# Patient Record
Sex: MALE | Race: WHITE | NOT HISPANIC OR LATINO | Employment: FULL TIME | URBAN - METROPOLITAN AREA
[De-identification: names, ages, dates, MRNs, and addresses within clinical notes are randomized per-mention and may not be internally consistent; named-entity substitution may affect disease eponyms.]

---

## 2017-12-29 RX ORDER — FLUTICASONE PROPIONATE 50 MCG
1-2 SPRAY, SUSPENSION (ML) NASAL DAILY PRN
COMMUNITY

## 2017-12-29 RX ORDER — MULTIPLE VITAMINS W/ MINERALS TAB 9MG-400MCG
1 TAB ORAL DAILY
COMMUNITY

## 2017-12-29 RX ORDER — KETOCONAZOLE 20 MG/G
CREAM TOPICAL DAILY PRN
Status: ON HOLD | COMMUNITY
End: 2018-01-04

## 2017-12-29 RX ORDER — LISINOPRIL AND HYDROCHLOROTHIAZIDE 20; 25 MG/1; MG/1
1 TABLET ORAL EVERY EVENING
COMMUNITY

## 2018-01-04 ENCOUNTER — ANESTHESIA (OUTPATIENT)
Dept: SURGERY | Facility: CLINIC | Age: 53
DRG: 330 | End: 2018-01-04
Payer: COMMERCIAL

## 2018-01-04 ENCOUNTER — ANESTHESIA EVENT (OUTPATIENT)
Dept: SURGERY | Facility: CLINIC | Age: 53
DRG: 330 | End: 2018-01-04
Payer: COMMERCIAL

## 2018-01-04 ENCOUNTER — HOSPITAL ENCOUNTER (INPATIENT)
Facility: CLINIC | Age: 53
LOS: 2 days | Discharge: HOME | DRG: 330 | End: 2018-01-06
Attending: COLON & RECTAL SURGERY | Admitting: COLON & RECTAL SURGERY
Payer: COMMERCIAL

## 2018-01-04 DIAGNOSIS — Z90.49 S/P PARTIAL RESECTION OF COLON: Primary | ICD-10-CM

## 2018-01-04 DIAGNOSIS — C18.7 MALIGNANT NEOPLASM OF SIGMOID (FLEXURE) (H): ICD-10-CM

## 2018-01-04 LAB
BUN SERPL-MCNC: 11 MG/DL (ref 7–30)
CALCIUM SERPL-MCNC: 8.5 MG/DL (ref 8.5–10.1)
CHLORIDE SERPL-SCNC: 98 MMOL/L (ref 94–109)
CO2 SERPL-SCNC: 27 MMOL/L (ref 20–32)
CREAT SERPL-MCNC: 0.98 MG/DL (ref 0.66–1.25)
GFR SERPL CREATININE-BSD FRML MDRD: 80 ML/MIN/1.7M2
GLUCOSE BLDC GLUCOMTR-MCNC: 234 MG/DL (ref 70–99)
GLUCOSE BLDC GLUCOMTR-MCNC: 242 MG/DL (ref 70–99)
GLUCOSE BLDC GLUCOMTR-MCNC: 269 MG/DL (ref 70–99)
GLUCOSE BLDC GLUCOMTR-MCNC: 281 MG/DL (ref 70–99)
GLUCOSE SERPL-MCNC: 225 MG/DL (ref 70–99)
GLUCOSE SERPL-MCNC: 269 MG/DL (ref 70–99)
MAGNESIUM SERPL-MCNC: 1.7 MG/DL (ref 1.6–2.3)
PHOSPHATE SERPL-MCNC: 4.5 MG/DL (ref 2.5–4.5)
PLATELET # BLD AUTO: 196 10E9/L (ref 150–450)
POTASSIUM SERPL-SCNC: 3.4 MMOL/L (ref 3.4–5.3)
POTASSIUM SERPL-SCNC: 3.9 MMOL/L (ref 3.4–5.3)
SODIUM SERPL-SCNC: 133 MMOL/L (ref 133–144)

## 2018-01-04 PROCEDURE — 71000013 ZZH RECOVERY PHASE 1 LEVEL 1 EA ADDTL HR: Performed by: COLON & RECTAL SURGERY

## 2018-01-04 PROCEDURE — 25000128 H RX IP 250 OP 636: Performed by: ANESTHESIOLOGY

## 2018-01-04 PROCEDURE — 27210995 ZZH RX 272: Performed by: COLON & RECTAL SURGERY

## 2018-01-04 PROCEDURE — 99207 ZZC CONSULT E&M CHANGED TO INITIAL LEVEL: CPT | Performed by: NURSE PRACTITIONER

## 2018-01-04 PROCEDURE — 25800025 ZZH RX 258: Performed by: COLON & RECTAL SURGERY

## 2018-01-04 PROCEDURE — 88304 TISSUE EXAM BY PATHOLOGIST: CPT | Mod: 26 | Performed by: COLON & RECTAL SURGERY

## 2018-01-04 PROCEDURE — 12000007 ZZH R&B INTERMEDIATE

## 2018-01-04 PROCEDURE — 82947 ASSAY GLUCOSE BLOOD QUANT: CPT | Performed by: ANESTHESIOLOGY

## 2018-01-04 PROCEDURE — 88305 TISSUE EXAM BY PATHOLOGIST: CPT | Performed by: COLON & RECTAL SURGERY

## 2018-01-04 PROCEDURE — 83735 ASSAY OF MAGNESIUM: CPT | Performed by: COLON & RECTAL SURGERY

## 2018-01-04 PROCEDURE — 0HBHXZZ EXCISION OF RIGHT UPPER LEG SKIN, EXTERNAL APPROACH: ICD-10-PCS | Performed by: COLON & RECTAL SURGERY

## 2018-01-04 PROCEDURE — 25000125 ZZHC RX 250: Performed by: NURSE PRACTITIONER

## 2018-01-04 PROCEDURE — 88304 TISSUE EXAM BY PATHOLOGIST: CPT | Performed by: COLON & RECTAL SURGERY

## 2018-01-04 PROCEDURE — 88305 TISSUE EXAM BY PATHOLOGIST: CPT | Mod: 26 | Performed by: COLON & RECTAL SURGERY

## 2018-01-04 PROCEDURE — 88309 TISSUE EXAM BY PATHOLOGIST: CPT | Performed by: COLON & RECTAL SURGERY

## 2018-01-04 PROCEDURE — 25000125 ZZHC RX 250: Performed by: COLON & RECTAL SURGERY

## 2018-01-04 PROCEDURE — 25000128 H RX IP 250 OP 636: Performed by: NURSE PRACTITIONER

## 2018-01-04 PROCEDURE — 36000067 ZZH SURGERY LEVEL 5 1ST 30 MIN: Performed by: COLON & RECTAL SURGERY

## 2018-01-04 PROCEDURE — 25000128 H RX IP 250 OP 636: Performed by: COLON & RECTAL SURGERY

## 2018-01-04 PROCEDURE — 25000132 ZZH RX MED GY IP 250 OP 250 PS 637: Performed by: COLON & RECTAL SURGERY

## 2018-01-04 PROCEDURE — 25000128 H RX IP 250 OP 636: Performed by: NURSE ANESTHETIST, CERTIFIED REGISTERED

## 2018-01-04 PROCEDURE — 36000069 ZZH SURGERY LEVEL 5 EA 15 ADDTL MIN: Performed by: COLON & RECTAL SURGERY

## 2018-01-04 PROCEDURE — 25000131 ZZH RX MED GY IP 250 OP 636 PS 637: Performed by: ANESTHESIOLOGY

## 2018-01-04 PROCEDURE — P9041 ALBUMIN (HUMAN),5%, 50ML: HCPCS | Performed by: NURSE ANESTHETIST, CERTIFIED REGISTERED

## 2018-01-04 PROCEDURE — 25000131 ZZH RX MED GY IP 250 OP 636 PS 637: Performed by: NURSE PRACTITIONER

## 2018-01-04 PROCEDURE — 00000146 ZZHCL STATISTIC GLUCOSE BY METER IP

## 2018-01-04 PROCEDURE — 80048 BASIC METABOLIC PNL TOTAL CA: CPT | Performed by: COLON & RECTAL SURGERY

## 2018-01-04 PROCEDURE — 40000169 ZZH STATISTIC PRE-PROCEDURE ASSESSMENT I: Performed by: COLON & RECTAL SURGERY

## 2018-01-04 PROCEDURE — 27210794 ZZH OR GENERAL SUPPLY STERILE: Performed by: COLON & RECTAL SURGERY

## 2018-01-04 PROCEDURE — 85049 AUTOMATED PLATELET COUNT: CPT | Performed by: COLON & RECTAL SURGERY

## 2018-01-04 PROCEDURE — 99223 1ST HOSP IP/OBS HIGH 75: CPT | Performed by: NURSE PRACTITIONER

## 2018-01-04 PROCEDURE — 71000012 ZZH RECOVERY PHASE 1 LEVEL 1 FIRST HR: Performed by: COLON & RECTAL SURGERY

## 2018-01-04 PROCEDURE — 37000008 ZZH ANESTHESIA TECHNICAL FEE, 1ST 30 MIN: Performed by: COLON & RECTAL SURGERY

## 2018-01-04 PROCEDURE — 88309 TISSUE EXAM BY PATHOLOGIST: CPT | Mod: 26 | Performed by: COLON & RECTAL SURGERY

## 2018-01-04 PROCEDURE — 84100 ASSAY OF PHOSPHORUS: CPT | Performed by: COLON & RECTAL SURGERY

## 2018-01-04 PROCEDURE — 36415 COLL VENOUS BLD VENIPUNCTURE: CPT | Performed by: ANESTHESIOLOGY

## 2018-01-04 PROCEDURE — 84132 ASSAY OF SERUM POTASSIUM: CPT | Performed by: ANESTHESIOLOGY

## 2018-01-04 PROCEDURE — 25000125 ZZHC RX 250: Performed by: NURSE ANESTHETIST, CERTIFIED REGISTERED

## 2018-01-04 PROCEDURE — 0DBW0ZX EXCISION OF PERITONEUM, OPEN APPROACH, DIAGNOSTIC: ICD-10-PCS | Performed by: COLON & RECTAL SURGERY

## 2018-01-04 PROCEDURE — 36415 COLL VENOUS BLD VENIPUNCTURE: CPT | Performed by: COLON & RECTAL SURGERY

## 2018-01-04 PROCEDURE — 0DTN0ZZ RESECTION OF SIGMOID COLON, OPEN APPROACH: ICD-10-PCS | Performed by: COLON & RECTAL SURGERY

## 2018-01-04 PROCEDURE — 37000009 ZZH ANESTHESIA TECHNICAL FEE, EACH ADDTL 15 MIN: Performed by: COLON & RECTAL SURGERY

## 2018-01-04 PROCEDURE — 25000566 ZZH SEVOFLURANE, EA 15 MIN: Performed by: COLON & RECTAL SURGERY

## 2018-01-04 RX ORDER — ALBUMIN, HUMAN INJ 5% 5 %
SOLUTION INTRAVENOUS CONTINUOUS PRN
Status: DISCONTINUED | OUTPATIENT
Start: 2018-01-04 | End: 2018-01-04

## 2018-01-04 RX ORDER — LIDOCAINE HYDROCHLORIDE 20 MG/ML
INJECTION, SOLUTION INFILTRATION; PERINEURAL PRN
Status: DISCONTINUED | OUTPATIENT
Start: 2018-01-04 | End: 2018-01-04

## 2018-01-04 RX ORDER — POTASSIUM CL/LIDO/0.9 % NACL 10MEQ/0.1L
10 INTRAVENOUS SOLUTION, PIGGYBACK (ML) INTRAVENOUS
Status: DISCONTINUED | OUTPATIENT
Start: 2018-01-04 | End: 2018-01-06 | Stop reason: HOSPADM

## 2018-01-04 RX ORDER — NEOSTIGMINE METHYLSULFATE 1 MG/ML
VIAL (ML) INJECTION PRN
Status: DISCONTINUED | OUTPATIENT
Start: 2018-01-04 | End: 2018-01-04

## 2018-01-04 RX ORDER — POTASSIUM CHLORIDE 7.45 MG/ML
10 INJECTION INTRAVENOUS
Status: DISCONTINUED | OUTPATIENT
Start: 2018-01-04 | End: 2018-01-06 | Stop reason: HOSPADM

## 2018-01-04 RX ORDER — ONDANSETRON 2 MG/ML
INJECTION INTRAMUSCULAR; INTRAVENOUS PRN
Status: DISCONTINUED | OUTPATIENT
Start: 2018-01-04 | End: 2018-01-04

## 2018-01-04 RX ORDER — POTASSIUM CHLORIDE 1500 MG/1
20-40 TABLET, EXTENDED RELEASE ORAL
Status: DISCONTINUED | OUTPATIENT
Start: 2018-01-04 | End: 2018-01-06 | Stop reason: HOSPADM

## 2018-01-04 RX ORDER — POTASSIUM CHLORIDE 29.8 MG/ML
20 INJECTION INTRAVENOUS
Status: DISCONTINUED | OUTPATIENT
Start: 2018-01-04 | End: 2018-01-04 | Stop reason: CLARIF

## 2018-01-04 RX ORDER — FENTANYL CITRATE 0.05 MG/ML
25-50 INJECTION, SOLUTION INTRAMUSCULAR; INTRAVENOUS
Status: DISCONTINUED | OUTPATIENT
Start: 2018-01-04 | End: 2018-01-04

## 2018-01-04 RX ORDER — SODIUM CHLORIDE, SODIUM LACTATE, POTASSIUM CHLORIDE, CALCIUM CHLORIDE 600; 310; 30; 20 MG/100ML; MG/100ML; MG/100ML; MG/100ML
INJECTION, SOLUTION INTRAVENOUS CONTINUOUS
Status: DISCONTINUED | OUTPATIENT
Start: 2018-01-04 | End: 2018-01-04

## 2018-01-04 RX ORDER — SODIUM CHLORIDE, SODIUM LACTATE, POTASSIUM CHLORIDE, CALCIUM CHLORIDE 600; 310; 30; 20 MG/100ML; MG/100ML; MG/100ML; MG/100ML
INJECTION, SOLUTION INTRAVENOUS CONTINUOUS
Status: DISCONTINUED | OUTPATIENT
Start: 2018-01-04 | End: 2018-01-04 | Stop reason: HOSPADM

## 2018-01-04 RX ORDER — ONDANSETRON 2 MG/ML
4 INJECTION INTRAMUSCULAR; INTRAVENOUS EVERY 6 HOURS PRN
Status: DISCONTINUED | OUTPATIENT
Start: 2018-01-04 | End: 2018-01-06 | Stop reason: HOSPADM

## 2018-01-04 RX ORDER — SODIUM CHLORIDE, SODIUM LACTATE, POTASSIUM CHLORIDE, CALCIUM CHLORIDE 600; 310; 30; 20 MG/100ML; MG/100ML; MG/100ML; MG/100ML
INJECTION, SOLUTION INTRAVENOUS CONTINUOUS PRN
Status: DISCONTINUED | OUTPATIENT
Start: 2018-01-04 | End: 2018-01-04

## 2018-01-04 RX ORDER — HEPARIN SODIUM 5000 [USP'U]/.5ML
5000 INJECTION, SOLUTION INTRAVENOUS; SUBCUTANEOUS
Status: COMPLETED | OUTPATIENT
Start: 2018-01-04 | End: 2018-01-04

## 2018-01-04 RX ORDER — GLYCOPYRROLATE 0.2 MG/ML
INJECTION, SOLUTION INTRAMUSCULAR; INTRAVENOUS PRN
Status: DISCONTINUED | OUTPATIENT
Start: 2018-01-04 | End: 2018-01-04

## 2018-01-04 RX ORDER — ACETAMINOPHEN 10 MG/ML
1000 INJECTION, SOLUTION INTRAVENOUS EVERY 6 HOURS
Status: DISCONTINUED | OUTPATIENT
Start: 2018-01-04 | End: 2018-01-06

## 2018-01-04 RX ORDER — PROPOFOL 10 MG/ML
INJECTION, EMULSION INTRAVENOUS PRN
Status: DISCONTINUED | OUTPATIENT
Start: 2018-01-04 | End: 2018-01-04

## 2018-01-04 RX ORDER — SODIUM CHLORIDE 9 MG/ML
INJECTION, SOLUTION INTRAVENOUS CONTINUOUS
Status: DISCONTINUED | OUTPATIENT
Start: 2018-01-04 | End: 2018-01-06

## 2018-01-04 RX ORDER — LIDOCAINE 40 MG/G
CREAM TOPICAL
Status: DISCONTINUED | OUTPATIENT
Start: 2018-01-04 | End: 2018-01-06 | Stop reason: HOSPADM

## 2018-01-04 RX ORDER — DEXTROSE MONOHYDRATE 25 G/50ML
25-50 INJECTION, SOLUTION INTRAVENOUS
Status: DISCONTINUED | OUTPATIENT
Start: 2018-01-04 | End: 2018-01-06 | Stop reason: HOSPADM

## 2018-01-04 RX ORDER — ALVIMOPAN 12 MG/1
12 CAPSULE ORAL ONCE
Status: COMPLETED | OUTPATIENT
Start: 2018-01-04 | End: 2018-01-04

## 2018-01-04 RX ORDER — MAGNESIUM HYDROXIDE 1200 MG/15ML
LIQUID ORAL PRN
Status: DISCONTINUED | OUTPATIENT
Start: 2018-01-04 | End: 2018-01-04

## 2018-01-04 RX ORDER — FENTANYL CITRATE 50 UG/ML
INJECTION, SOLUTION INTRAMUSCULAR; INTRAVENOUS PRN
Status: DISCONTINUED | OUTPATIENT
Start: 2018-01-04 | End: 2018-01-04

## 2018-01-04 RX ORDER — CIPROFLOXACIN 2 MG/ML
400 INJECTION, SOLUTION INTRAVENOUS SEE ADMIN INSTRUCTIONS
Status: DISCONTINUED | OUTPATIENT
Start: 2018-01-04 | End: 2018-01-04 | Stop reason: HOSPADM

## 2018-01-04 RX ORDER — ALVIMOPAN 12 MG/1
12 CAPSULE ORAL 2 TIMES DAILY
Status: DISCONTINUED | OUTPATIENT
Start: 2018-01-05 | End: 2018-01-05

## 2018-01-04 RX ORDER — ONDANSETRON 4 MG/1
4 TABLET, ORALLY DISINTEGRATING ORAL EVERY 30 MIN PRN
Status: DISCONTINUED | OUTPATIENT
Start: 2018-01-04 | End: 2018-01-04

## 2018-01-04 RX ORDER — HYDROMORPHONE HYDROCHLORIDE 1 MG/ML
.3-.5 INJECTION, SOLUTION INTRAMUSCULAR; INTRAVENOUS; SUBCUTANEOUS EVERY 5 MIN PRN
Status: DISCONTINUED | OUTPATIENT
Start: 2018-01-04 | End: 2018-01-04

## 2018-01-04 RX ORDER — VECURONIUM BROMIDE 1 MG/ML
INJECTION, POWDER, LYOPHILIZED, FOR SOLUTION INTRAVENOUS PRN
Status: DISCONTINUED | OUTPATIENT
Start: 2018-01-04 | End: 2018-01-04

## 2018-01-04 RX ORDER — NICOTINE POLACRILEX 4 MG
15-30 LOZENGE BUCCAL
Status: DISCONTINUED | OUTPATIENT
Start: 2018-01-04 | End: 2018-01-06 | Stop reason: HOSPADM

## 2018-01-04 RX ORDER — NALOXONE HYDROCHLORIDE 0.4 MG/ML
.1-.4 INJECTION, SOLUTION INTRAMUSCULAR; INTRAVENOUS; SUBCUTANEOUS
Status: DISCONTINUED | OUTPATIENT
Start: 2018-01-04 | End: 2018-01-06 | Stop reason: HOSPADM

## 2018-01-04 RX ORDER — CIPROFLOXACIN 2 MG/ML
400 INJECTION, SOLUTION INTRAVENOUS
Status: DISCONTINUED | OUTPATIENT
Start: 2018-01-04 | End: 2018-01-04 | Stop reason: HOSPADM

## 2018-01-04 RX ORDER — ONDANSETRON 2 MG/ML
4 INJECTION INTRAMUSCULAR; INTRAVENOUS EVERY 30 MIN PRN
Status: DISCONTINUED | OUTPATIENT
Start: 2018-01-04 | End: 2018-01-04

## 2018-01-04 RX ORDER — CIPROFLOXACIN 2 MG/ML
400 INJECTION, SOLUTION INTRAVENOUS EVERY 12 HOURS
Status: COMPLETED | OUTPATIENT
Start: 2018-01-05 | End: 2018-01-05

## 2018-01-04 RX ORDER — POTASSIUM CHLORIDE 1.5 G/1.58G
20-40 POWDER, FOR SOLUTION ORAL
Status: DISCONTINUED | OUTPATIENT
Start: 2018-01-04 | End: 2018-01-06 | Stop reason: HOSPADM

## 2018-01-04 RX ORDER — NALOXONE HYDROCHLORIDE 0.4 MG/ML
.1-.4 INJECTION, SOLUTION INTRAMUSCULAR; INTRAVENOUS; SUBCUTANEOUS
Status: DISCONTINUED | OUTPATIENT
Start: 2018-01-04 | End: 2018-01-04

## 2018-01-04 RX ORDER — ATORVASTATIN CALCIUM 40 MG/1
40 TABLET, FILM COATED ORAL AT BEDTIME
Status: DISCONTINUED | OUTPATIENT
Start: 2018-01-04 | End: 2018-01-06 | Stop reason: HOSPADM

## 2018-01-04 RX ORDER — MAGNESIUM SULFATE HEPTAHYDRATE 40 MG/ML
4 INJECTION, SOLUTION INTRAVENOUS EVERY 4 HOURS PRN
Status: DISCONTINUED | OUTPATIENT
Start: 2018-01-04 | End: 2018-01-06 | Stop reason: HOSPADM

## 2018-01-04 RX ORDER — ACETAMINOPHEN 325 MG/1
975 TABLET ORAL ONCE
Status: COMPLETED | OUTPATIENT
Start: 2018-01-04 | End: 2018-01-04

## 2018-01-04 RX ORDER — BUPIVACAINE HYDROCHLORIDE AND EPINEPHRINE 5; 5 MG/ML; UG/ML
INJECTION, SOLUTION PERINEURAL PRN
Status: DISCONTINUED | OUTPATIENT
Start: 2018-01-04 | End: 2018-01-04

## 2018-01-04 RX ADMIN — MIDAZOLAM 2 MG: 1 INJECTION INTRAMUSCULAR; INTRAVENOUS at 13:37

## 2018-01-04 RX ADMIN — VECURONIUM BROMIDE 2 MG: 1 INJECTION, POWDER, LYOPHILIZED, FOR SOLUTION INTRAVENOUS at 15:19

## 2018-01-04 RX ADMIN — SODIUM CHLORIDE, POTASSIUM CHLORIDE, SODIUM LACTATE AND CALCIUM CHLORIDE: 600; 310; 30; 20 INJECTION, SOLUTION INTRAVENOUS at 13:52

## 2018-01-04 RX ADMIN — SODIUM CHLORIDE, POTASSIUM CHLORIDE, SODIUM LACTATE AND CALCIUM CHLORIDE: 600; 310; 30; 20 INJECTION, SOLUTION INTRAVENOUS at 18:24

## 2018-01-04 RX ADMIN — HEPARIN SODIUM 5000 UNITS: 10000 INJECTION, SOLUTION INTRAVENOUS; SUBCUTANEOUS at 14:00

## 2018-01-04 RX ADMIN — VECURONIUM BROMIDE 2 MG: 1 INJECTION, POWDER, LYOPHILIZED, FOR SOLUTION INTRAVENOUS at 17:15

## 2018-01-04 RX ADMIN — INSULIN ASPART 1 UNITS: 100 INJECTION, SOLUTION INTRAVENOUS; SUBCUTANEOUS at 22:33

## 2018-01-04 RX ADMIN — SODIUM CHLORIDE: 9 INJECTION, SOLUTION INTRAVENOUS at 22:35

## 2018-01-04 RX ADMIN — MIDAZOLAM 2 MG: 1 INJECTION INTRAMUSCULAR; INTRAVENOUS at 13:42

## 2018-01-04 RX ADMIN — HYDROMORPHONE HYDROCHLORIDE 0.5 MG: 1 INJECTION, SOLUTION INTRAMUSCULAR; INTRAVENOUS; SUBCUTANEOUS at 14:32

## 2018-01-04 RX ADMIN — FENTANYL CITRATE 50 MCG: 50 INJECTION, SOLUTION INTRAMUSCULAR; INTRAVENOUS at 14:10

## 2018-01-04 RX ADMIN — ATORVASTATIN CALCIUM 40 MG: 40 TABLET, FILM COATED ORAL at 22:32

## 2018-01-04 RX ADMIN — DEXMEDETOMIDINE HYDROCHLORIDE 8 MCG: 100 INJECTION, SOLUTION INTRAVENOUS at 18:11

## 2018-01-04 RX ADMIN — VECURONIUM BROMIDE 1 MG: 1 INJECTION, POWDER, LYOPHILIZED, FOR SOLUTION INTRAVENOUS at 17:54

## 2018-01-04 RX ADMIN — METRONIDAZOLE 500 MG: 500 INJECTION, SOLUTION INTRAVENOUS at 14:01

## 2018-01-04 RX ADMIN — Medication 2 G: at 23:22

## 2018-01-04 RX ADMIN — FENTANYL CITRATE 50 MCG: 50 INJECTION, SOLUTION INTRAMUSCULAR; INTRAVENOUS at 13:42

## 2018-01-04 RX ADMIN — NEOSTIGMINE METHYLSULFATE 5 MG: 1 INJECTION INTRAMUSCULAR; INTRAVENOUS; SUBCUTANEOUS at 18:29

## 2018-01-04 RX ADMIN — VECURONIUM BROMIDE 2 MG: 1 INJECTION, POWDER, LYOPHILIZED, FOR SOLUTION INTRAVENOUS at 16:15

## 2018-01-04 RX ADMIN — VECURONIUM BROMIDE 2 MG: 1 INJECTION, POWDER, LYOPHILIZED, FOR SOLUTION INTRAVENOUS at 16:54

## 2018-01-04 RX ADMIN — ALBUMIN (HUMAN): 12.5 SOLUTION INTRAVENOUS at 14:02

## 2018-01-04 RX ADMIN — FENTANYL CITRATE 50 MCG: 50 INJECTION, SOLUTION INTRAMUSCULAR; INTRAVENOUS at 13:47

## 2018-01-04 RX ADMIN — DEXMEDETOMIDINE HYDROCHLORIDE 8 MCG: 100 INJECTION, SOLUTION INTRAVENOUS at 15:49

## 2018-01-04 RX ADMIN — CIPROFLOXACIN 400 MG: 2 INJECTION INTRAVENOUS at 14:01

## 2018-01-04 RX ADMIN — ONDANSETRON 4 MG: 2 INJECTION INTRAMUSCULAR; INTRAVENOUS at 18:20

## 2018-01-04 RX ADMIN — DEXMEDETOMIDINE HYDROCHLORIDE 4 MCG: 100 INJECTION, SOLUTION INTRAVENOUS at 16:14

## 2018-01-04 RX ADMIN — GLYCOPYRROLATE 0.8 MG: 0.2 INJECTION, SOLUTION INTRAMUSCULAR; INTRAVENOUS at 18:28

## 2018-01-04 RX ADMIN — VECURONIUM BROMIDE 2 MG: 1 INJECTION, POWDER, LYOPHILIZED, FOR SOLUTION INTRAVENOUS at 16:26

## 2018-01-04 RX ADMIN — HYDROMORPHONE HYDROCHLORIDE 0.5 MG: 1 INJECTION, SOLUTION INTRAMUSCULAR; INTRAVENOUS; SUBCUTANEOUS at 17:02

## 2018-01-04 RX ADMIN — ACETAMINOPHEN 975 MG: 325 TABLET, FILM COATED ORAL at 11:47

## 2018-01-04 RX ADMIN — SODIUM CHLORIDE, POTASSIUM CHLORIDE, SODIUM LACTATE AND CALCIUM CHLORIDE: 600; 310; 30; 20 INJECTION, SOLUTION INTRAVENOUS at 12:20

## 2018-01-04 RX ADMIN — ROCURONIUM BROMIDE 50 MG: 10 INJECTION INTRAVENOUS at 13:47

## 2018-01-04 RX ADMIN — VECURONIUM BROMIDE 2 MG: 1 INJECTION, POWDER, LYOPHILIZED, FOR SOLUTION INTRAVENOUS at 15:47

## 2018-01-04 RX ADMIN — SODIUM CHLORIDE, POTASSIUM CHLORIDE, SODIUM LACTATE AND CALCIUM CHLORIDE: 600; 310; 30; 20 INJECTION, SOLUTION INTRAVENOUS at 15:49

## 2018-01-04 RX ADMIN — ALVIMOPAN 12 MG: 12 CAPSULE ORAL at 11:48

## 2018-01-04 RX ADMIN — SODIUM CHLORIDE, POTASSIUM CHLORIDE, SODIUM LACTATE AND CALCIUM CHLORIDE: 600; 310; 30; 20 INJECTION, SOLUTION INTRAVENOUS at 15:39

## 2018-01-04 RX ADMIN — LIDOCAINE HYDROCHLORIDE 80 MG: 20 INJECTION, SOLUTION INFILTRATION; PERINEURAL at 13:47

## 2018-01-04 RX ADMIN — VECURONIUM BROMIDE 4 MG: 1 INJECTION, POWDER, LYOPHILIZED, FOR SOLUTION INTRAVENOUS at 14:25

## 2018-01-04 RX ADMIN — Medication: at 19:22

## 2018-01-04 RX ADMIN — VECURONIUM BROMIDE 1 MG: 1 INJECTION, POWDER, LYOPHILIZED, FOR SOLUTION INTRAVENOUS at 18:01

## 2018-01-04 RX ADMIN — FENTANYL CITRATE 50 MCG: 50 INJECTION, SOLUTION INTRAMUSCULAR; INTRAVENOUS at 14:41

## 2018-01-04 RX ADMIN — DEXMEDETOMIDINE HYDROCHLORIDE 8 MCG: 100 INJECTION, SOLUTION INTRAVENOUS at 14:51

## 2018-01-04 RX ADMIN — SODIUM CHLORIDE 4 UNITS: 9 INJECTION, SOLUTION INTRAVENOUS at 20:02

## 2018-01-04 RX ADMIN — FENTANYL CITRATE 50 MCG: 50 INJECTION, SOLUTION INTRAMUSCULAR; INTRAVENOUS at 19:28

## 2018-01-04 RX ADMIN — METRONIDAZOLE 500 MG: 500 INJECTION, SOLUTION INTRAVENOUS at 22:07

## 2018-01-04 RX ADMIN — DEXMEDETOMIDINE HYDROCHLORIDE 4 MCG: 100 INJECTION, SOLUTION INTRAVENOUS at 16:15

## 2018-01-04 RX ADMIN — Medication 0.5 MG: at 19:49

## 2018-01-04 RX ADMIN — PROPOFOL 200 MG: 10 INJECTION, EMULSION INTRAVENOUS at 13:47

## 2018-01-04 ASSESSMENT — ENCOUNTER SYMPTOMS: DYSRHYTHMIAS: 0

## 2018-01-04 ASSESSMENT — LIFESTYLE VARIABLES: TOBACCO_USE: 0

## 2018-01-04 NOTE — ANESTHESIA PREPROCEDURE EVALUATION
Procedure: Procedure(s):  LAPAROSCOPIC ASSISTED SIGMOID COLECTOMY  Preop diagnosis: COLON CANCER    Not on File  Past Medical History:   Diagnosis Date     Adenocarcinoma of sigmoid colon (H)      Allergic rhinitis      Diabetes (H)     type 2     Erectile dysfunction      Hyperlipidemia      Hypertension      Past Surgical History:   Procedure Laterality Date     COLONOSCOPY       wisdom teeth removal       Social History   Substance Use Topics     Smoking status: Never Smoker     Smokeless tobacco: Never Used     Alcohol use Yes      Comment: rare     Prior to Admission medications    Medication Sig Start Date End Date Taking? Authorizing Provider   Sildenafil Citrate (VIAGRA PO) Take 100 mg by mouth daily as needed   Yes Reported, Patient   multivitamin, therapeutic with minerals (THERA-VIT-M) TABS tablet Take 1 tablet by mouth daily   Yes Reported, Patient   ASPIRIN PO Take 81 mg by mouth daily   Yes Reported, Patient   Insulin Glargine (BASAGLAR KWIKPEN SC) Inject 15 Units Subcutaneous every evening    Yes Reported, Patient   lisinopril-hydrochlorothiazide (PRINZIDE/ZESTORETIC) 20-25 MG per tablet Take 1 tablet by mouth every evening   Yes Reported, Patient   METFORMIN HCL PO Take 1,000 mg by mouth 2 times daily   Yes Reported, Patient   ATORVASTATIN CALCIUM PO Take 40 mg by mouth daily   Yes Reported, Patient   Omega-3 Fatty Acids (OMEGA 3 PO) Take 2 tablets by mouth 2 times daily   Yes Reported, Patient   Cetirizine HCl (ZYRTEC PO) Take 10 mg by mouth daily as needed   Yes Reported, Patient   fluticasone (FLONASE) 50 MCG/ACT spray Spray 1-2 sprays into both nostrils daily as needed for rhinitis or allergies   Yes Reported, Patient   Naproxen Sodium (ALEVE PO) Take 2 tablets by mouth daily as needed for moderate pain   Yes Reported, Patient   NEOMYCIN SULFATE PO Take 1,000 mg by mouth 3 times daily Per PreOp Instructions  (Takes 2 x 500mg tablet = 1000mg dose) 1/3/18 1/3/18  Reported, Patient   METRONIDAZOLE  PO Take 500 mg by mouth 3 times daily Per PreOp Instructions 1/3/18 1/3/18  Reported, Patient     Current Facility-Administered Medications Ordered in Epic   Medication Dose Route Frequency Last Rate Last Dose     heparin sodium PF injection 5,000 Units  5,000 Units Subcutaneous Pre-Op/Pre-procedure x 1 dose         ciprofloxacin (CIPRO) infusion 400 mg  400 mg Intravenous Pre-Op/Pre-procedure x 1 dose         ciprofloxacin (CIPRO) infusion 400 mg  400 mg Intravenous See Admin Instructions         metroNIDAZOLE (FLAGYL) infusion 500 mg  500 mg Intravenous Pre-Op/Pre-procedure x 1 dose         metroNIDAZOLE (FLAGYL) infusion 500 mg  500 mg Intravenous See Admin Instructions         lidocaine 1 % 1 mL  1 mL Other Q1H PRN         sodium chloride (PF) 0.9% PF flush 3 mL  3 mL Intracatheter Q8H         lactated ringers infusion   Intravenous Continuous 25 mL/hr at 01/04/18 1220       No current [facility]-ordered outpatient prescriptions on file.       lactated ringers 25 mL/hr at 01/04/18 1220     Wt Readings from Last 1 Encounters:   01/04/18 117 kg (258 lb)     Temp Readings from Last 1 Encounters:   01/04/18 36.3  C (97.4  F) (Temporal)     BP Readings from Last 6 Encounters:   01/04/18 119/84     Pulse Readings from Last 4 Encounters:   No data found for Pulse     Resp Readings from Last 1 Encounters:   01/04/18 18     SpO2 Readings from Last 1 Encounters:   01/04/18 100%     Recent Labs   Lab Test  01/04/18   1132   POTASSIUM  3.4   GLC  225*     No results for input(s): AST, ALT, ALKPHOS, BILITOTAL, LIPASE in the last 41213 hours.  No results for input(s): WBC, HGB, PLT in the last 24282 hours.  No results for input(s): ABO, RH in the last 05906 hours.  No results for input(s): INR, PTT in the last 91632 hours.   No results for input(s): TROPI in the last 93528 hours.  No results for input(s): PH, PCO2, PO2, HCO3 in the last 53096 hours.  No results for input(s): HCG in the last 37839 hours.  No results found for this  or any previous visit (from the past 744 hour(s)).    RECENT LABS:   ECG:   ECHO:     Anesthesia Evaluation     .             ROS/MED HX    ENT/Pulmonary:     (+)allergic rhinitis, , . .   (-) tobacco use and sleep apnea   Neurologic:       Cardiovascular:     (+) Dyslipidemia, hypertension----. : . . . :. .      (-) arrhythmias   METS/Exercise Tolerance:     Hematologic:         Musculoskeletal:         GI/Hepatic:        (-) GERD   Renal/Genitourinary:         Endo:     (+) type II DM thyroid problem  Thyroid disease - Other, .      Psychiatric:         Infectious Disease:         Malignancy:         Other:                     Physical Exam  Normal systems: cardiovascular, pulmonary and dental    Airway   Mallampati: III  TM distance: >3 FB  Neck ROM: full    Dental     Cardiovascular       Pulmonary                     Anesthesia Plan      History & Physical Review  History and physical reviewed and following examination; no interval change.    ASA Status:  2 .    NPO Status:  > 8 hours    Plan for General and ETT with Intravenous and Propofol induction. Maintenance will be Inhalation.    PONV prophylaxis:  Ondansetron (or other 5HT-3) and Dexamethasone or Solumedrol  Additional equipment: Videolaryngoscope and 2nd IV 12.5mg Benadryl, 4mg Decadron and Zofran for PONV      Postoperative Care  Postoperative pain management:  IV analgesics and Oral pain medications.      Consents  Anesthetic plan, risks, benefits and alternatives discussed with:  Patient and Spouse..                          .

## 2018-01-04 NOTE — IP AVS SNAPSHOT
MRN:[id number]                      After Visit Summary   1/4/2018    Edilberto Zhao    MRN: [id number]           Thank you!     Thank you for choosing [facility] for your care. Our goal is always to provide you with excellent care. Hearing back from our patients is one way we can continue to improve our services. Please take a few minutes to complete the written survey that you may receive in the mail after you visit with us. Thank you!        Patient Information     Date Of Birth          1965        Designated Caregiver       Most Recent Value    Caregiver    Will someone help with your care after discharge? yes    Name of designated caregiver Rosa Isela Zhao    Phone number of caregiver [phone]    Caregiver address [address].       About your hospital stay     You were admitted on:  January 4, 2018 You last received care in the:  [place] [address] [facility]    You were discharged on:  January 6, 2018        Reason for your hospital stay       The patient was in the hospital to have surgery for cancer                  Who to Call     For medical emergencies, please call 911.  For non-urgent questions about your medical care, please call your primary care provider or clinic, [phone]  For questions related to your surgery, please call your surgery clinic        Attending Provider     Provider Specialty    Chiquis Pierre MD Colon and Rectal Surgery       Primary Care Provider Office Phone # Fax #    Carlie Costa [phone]      After Care Instructions     Activity       Your activity upon discharge:No heavy lifting or straining over 15-20 lbs for 6 weeks. No Driving while taking narcotic pain medications            Diet       Follow this diet upon discharge: Continue a low residue diet for 6 weeks.                  Follow-up Appointments     Follow-up and recommended labs and tests        Follow up in the office in 3-4 weeks with   Gabby or at your already scheduled post op visit. Call [phone] to schedule your appointment. Call the office at [phone] if you develop fever, uncontrolled pain, bleeding, nausea, vomiting, or constipation.                  Further instructions from your care team       Discharge Instructions following Abdominal Surgery  [facility]      Bowel Function  After removal of a portion of the intestines, it is normal for bowel movements to be erratic.  They are often looser and more frequent.  This condition generally resolves within a few weeks or it can be controlled with diet or medication.  Diet  In general, you may return to a well-balanced diet upon discharge.  Your doctor will let you know when to add extra fiber to your diet.  Be sure to drink plenty of fluids.  Incision  You should expect some discomfort in the area of your incision, particularly as you increase your activity.  If you notice an area of increasing redness or new drainage, please call your doctor.  You may shower as desired but refrain from tub baths or swimming until the incisions are fully healed.  Activity  Gradually increase your activity each day.  There are generally no restrictions on walking, climbing stairs, or riding in a car.  You can usually drive a car 7-10 days after your leave the hospital.  Do not drive while taking narcotic pain medications.  Ask your doctor regarding resumption of physical sexual activity; in most cases, you can resume sex after a few weeks.  Your physician will advise you about when to return to work.  It is preferable to have somebody stay with you at home until you are fully healed and able to resume a normal level of activity   Medications  You will be discharged with a prescription pain medication and any medications you were taking prior to surgery.  Do not drive while taking narcotic pain medications.  If you need additional medications or a refill, you must  "call your doctor during normal business hours.  It is the policy of [facility] to not refill pain medication after hours or on weekends because your chart is not available.  Follow-up  Typically, you will be asked to schedule a follow-up office visit 1 to 4 weeks after surgery.  If you have any questions related to follow-up, medications, or your condition, please call the office.      Call your surgeon if you have these signs or symptoms:  ([phone])    Problem with the incision, including increasing pain, swelling, redness, or drainage    Increasing abdominal pain    Uncontrolled nausea or vomiting    Fever or chills    Constipation  (no bowel movement for 3 days)    Diarrhea (more than 3 watery stools within 24 hours)    Bleeding from the rectum, wound, or stoma    Any questions or concerns      Pending Results     Date and Time Order Name Status Description    1/4/2018 1422 Surgical pathology exam In process             Statement of Approval     Ordered          01/06/18 0941  I have reviewed and agree with all the recommendations and orders detailed in this document.  EFFECTIVE NOW     Approved and electronically signed by:  Chiquis Crouch MD             Admission Information     Date & Time Provider Department Dept. Phone    1/4/2018 Chiquis Pierre MD [place] [address] [facility] [phone]      Your Vitals Were     Blood Pressure Pulse Temperature Respirations Height Weight    137/85 (BP Location: Right arm) 73 98.2  F (36.8  C) (Oral) 16 1.753 m (5' 9\") 117 kg (258 lb)    Pulse Oximetry BMI (Body Mass Index)                97% 38.1 kg/m2          [facility] Information     [employer] lets you send messages to your doctor, view your test results, renew your prescriptions, schedule appointments and more. To sign up, go to [email or web address] . Click on \"Log in\" on the left side of the screen, which will take you to the Welcome page. Then click on " "\"Sign up Now\" on the right side of the page.     You will be asked to enter the access code listed below, as well as some personal information. Please follow the directions to create your username and password.     Your access code is: [email or web address]  Expires: 2018 12:00 PM     Your access code will  in 90 days. If you need help or a new code, please call your [place] clinic or [phone].        Care EveryWhere ID     This is your Care EveryWhere ID. This could be used by other organizations to access your [place] medical records  [id number]        Equal Access to Services     [facility]: Hadjosé miguel Palacios, antonio oakley, alvaro bach, hollie mann . So [facility] [phone].    ATENCIÓN: Si habla español, tiene a fernandes disposición servicios gratuitos de asistencia lingüística. Llame al [phone].    We comply with applicable federal civil rights laws and [place] laws. We do not discriminate on the basis of race, color, national origin, age, disability, sex, sexual orientation, or gender identity.               Review of your medicines      START taking        Dose / Directions    enoxaparin 40 MG/0.4ML injection   Commonly known as:  LOVENOX        Dose:  40 mg   Inject 0.4 mLs (40 mg) Subcutaneous every 24 hours   Quantity:  28 Syringe   Refills:  0       oxyCODONE IR 5 MG tablet   Commonly known as:  ROXICODONE   Used for:  S/P partial resection of colon   Notes to Patient:  No alcohol and no driving while on this medication        Dose:  5 mg   Take 1 tablet (5 mg) by mouth every 4 hours as needed for moderate to severe pain   Quantity:  30 tablet   Refills:  0         CONTINUE these medicines which have NOT CHANGED        Dose / Directions    ALEVE PO   Notes to Patient:  Resume per patient        Dose:  2 tablet   Take 2 tablets by mouth daily as needed for moderate pain   Refills:  0       ASPIRIN PO   Notes to Patient:  Resume per patient "        Dose:  81 mg   Take 81 mg by mouth daily   Refills:  0       ATORVASTATIN CALCIUM PO        Dose:  40 mg   Take 40 mg by mouth At Bedtime   Refills:  0       BASAGLAR KWIKPEN SC        Dose:  15 Units   Inject 15 Units Subcutaneous every evening   Refills:  0       fluticasone 50 MCG/ACT spray   Commonly known as:  FLONASE   Notes to Patient:  Resume per patient         Dose:  1-2 spray   Spray 1-2 sprays into both nostrils daily as needed for rhinitis or allergies   Refills:  0       lisinopril-hydrochlorothiazide 20-25 MG per tablet   Commonly known as:  PRINZIDE/ZESTORETIC   Notes to Patient:  Resume per patient        Dose:  1 tablet   Take 1 tablet by mouth every evening   Refills:  0       METFORMIN HCL PO        Dose:  1000 mg   Take 1,000 mg by mouth 2 times daily   Refills:  0       multivitamin, therapeutic with minerals Tabs tablet   Notes to Patient:  Resume per patient        Dose:  1 tablet   Take 1 tablet by mouth daily   Refills:  0       OMEGA 3 PO   Notes to Patient:  Resume per patient        Dose:  2 tablet   Take 2 tablets by mouth 2 times daily   Refills:  0       VIAGRA PO   Notes to Patient:  Resume per patient        Dose:  100 mg   Take 100 mg by mouth daily as needed   Refills:  0       ZYRTEC PO   Notes to Patient:  Resume per patient        Dose:  10 mg   Take 10 mg by mouth daily as needed   Refills:  0         STOP taking     METRONIDAZOLE PO           NEOMYCIN SULFATE PO                Where to get your medicines      These medications were sent to [facility] - [address]  [address], [place] [address]     Phone:  [phone]     enoxaparin 40 MG/0.4ML injection         Some of these will need a paper prescription and others can be bought over the counter. Ask your nurse if you have questions.     Bring a paper prescription for each of these medications     oxyCODONE IR 5 MG tablet                Protect others around you: Learn  how to safely use, store and throw away your medicines at www.disposemymeds.org.             Medication List: This is a list of all your medications and when to take them. Check marks below indicate your daily home schedule. Keep this list as a reference.      Medications           Morning Afternoon Evening Bedtime As Needed    ALEVE PO   Take 2 tablets by mouth daily as needed for moderate pain   Notes to Patient:  Resume per patient                                   ASPIRIN PO   Take 81 mg by mouth daily   Notes to Patient:  Resume per patient                                   ATORVASTATIN CALCIUM PO   Take 40 mg by mouth At Bedtime   Last time this was given:  40 mg on 1/5/2018 10:00 PM                        1/6/18             BASAGLAR KWIKPEN SC   Inject 15 Units Subcutaneous every evening   Last time this was given:  7 Units on 1/5/2018 10:04 PM   Next Dose Due:  1/6/18 pm dose                    1/6/18               enoxaparin 40 MG/0.4ML injection   Commonly known as:  LOVENOX   Inject 0.4 mLs (40 mg) Subcutaneous every 24 hours   Last time this was given:  40 mg on 1/6/2018 11:21 AM   Next Dose Due:  1/7/18 12 pm             1/7/18                   fluticasone 50 MCG/ACT spray   Commonly known as:  FLONASE   Spray 1-2 sprays into both nostrils daily as needed for rhinitis or allergies   Notes to Patient:  Resume per patient                                    lisinopril-hydrochlorothiazide 20-25 MG per tablet   Commonly known as:  PRINZIDE/ZESTORETIC   Take 1 tablet by mouth every evening   Notes to Patient:  Resume per patient                                   METFORMIN HCL PO   Take 1,000 mg by mouth 2 times daily   Last time this was given:  500 mg on 1/6/2018 10:03 AM   Next Dose Due:  1/6/18 evening dose                       1/6/18               multivitamin, therapeutic with minerals Tabs tablet   Take 1 tablet by mouth daily   Notes to Patient:  Resume per patient                                    OMEGA 3 PO   Take 2 tablets by mouth 2 times daily   Notes to Patient:  Resume per patient                                      oxyCODONE IR 5 MG tablet   Commonly known as:  ROXICODONE   Take 1 tablet (5 mg) by mouth every 4 hours as needed for moderate to severe pain   Notes to Patient:  No alcohol and no driving while on this medication                                   VIAGRA PO   Take 100 mg by mouth daily as needed   Notes to Patient:  Resume per patient                                   ZYRTEC PO   Take 10 mg by mouth daily as needed   Notes to Patient:  Resume per patient                                             More Information        Enoxaparin injection  Brand Name: Lovenox  What is this medicine?  ENOXAPARIN (ee nox a PA rin) is used after knee, hip, or abdominal surgeries to prevent blood clotting. It is also used to treat existing blood clots in the lungs or in the veins.  How should I use this medicine?  This medicine is for injection under the skin. It is usually given by a health-care professional. You or a family member may be trained on how to give the injections. If you are to give yourself injections, make sure you understand how to use the syringe, measure the dose if necessary, and give the injection. To avoid bruising, do not rub the site where this medicine has been injected. Do not take your medicine more often than directed. Do not stop taking except on the advice of your doctor or health care professional.  Make sure you receive a puncture-resistant container to dispose of the needles and syringes once you have finished with them. Do not reuse these items. Return the container to your doctor or health care professional for proper disposal.  Talk to your pediatrician regarding the use of this medicine in children. Special care may be needed.  What side effects may I notice from receiving this medicine?  Side effects that you should report to your doctor or health care professional as  soon as possible:    allergic reactions like skin rash, itching or hives, swelling of the face, lips, or tongue    feeling faint or lightheaded, falls    signs and symptoms of bleeding such as bloody or black, tarry stools; red or dark-brown urine; spitting up blood or brown material that looks like coffee grounds; red spots on the skin; unusual bruising or bleeding from the eye, gums, or nose  Side effects that usually do not require medical attention (report to your doctor or health care professional if they continue or are bothersome):    pain, redness, or irritation at site where injected  What may interact with this medicine?    aspirin and aspirin-like medicines    certain medicines that treat or prevent blood clots    dipyridamole    NSAIDs, medicines for pain and inflammation, like ibuprofen or naproxen  What if I miss a dose?  If you miss a dose, take it as soon as you can. If it is almost time for your next dose, take only that dose. Do not take double or extra doses.  Where should I keep my medicine?  Keep out of the reach of children.  Store at room temperature between 15 and 30 degrees C (59 and 86 degrees F). Do not freeze. If your injections have been specially prepared, you may need to store them in the refrigerator. Ask your pharmacist. Throw away any unused medicine after the expiration date.  What should I tell my health care provider before I take this medicine?  They need to know if you have any of these conditions:    bleeding disorders, hemorrhage, or hemophilia    infection of the heart or heart valves    kidney or liver disease    previous stroke    prosthetic heart valve    recent surgery or delivery of a baby    ulcer in the stomach or intestine, diverticulitis, or other bowel disease    an unusual or allergic reaction to enoxaparin, heparin, pork or pork products, other medicines, foods, dyes, or preservatives    pregnant or trying to get pregnant    breast-feeding  What should I watch  for while using this medicine?  Visit your doctor or health care professional for regular checks on your progress. Your condition will be monitored carefully while you are receiving this medicine.  Notify your doctor or health care professional and seek emergency treatment if you develop breathing problems; changes in vision; chest pain; severe, sudden headache; pain, swelling, warmth in the leg; trouble speaking; sudden numbness or weakness of the face, arm, or leg. These can be signs that your condition has gotten worse.  If you are going to have surgery, tell your doctor or health care professional that you are taking this medicine.  Do not stop taking this medicine without first talking to your doctor. Be sure to refill your prescription before you run out of medicine.  Avoid sports and activities that might cause injury while you are using this medicine. Severe falls or injuries can cause unseen bleeding. Be careful when using sharp tools or knives. Consider using an electric razor. Take special care brushing or flossing your teeth. Report any injuries, bruising, or red spots on the skin to your doctor or health care professional.  NOTE:This sheet is a summary. It may not cover all possible information. If you have questions about this medicine, talk to your doctor, pharmacist, or health care provider. Copyright  2017 [employer]

## 2018-01-04 NOTE — IP AVS SNAPSHOT
[facility] [place] [address] [facility]    [address] [place] [address]    Phone:  [phone]                                       After Visit Summary   1/4/2018    Edilberto Zhao    MRN: [id number]           After Visit Summary Signature Page     I have received my discharge instructions, and my questions have been answered. I have discussed any challenges I see with this plan with the nurse or doctor.    ..........................................................................................................................................  Patient/Patient Representative Signature      ..........................................................................................................................................  Patient Representative Print Name and Relationship to Patient    ..................................................               ................................................  Date                                            Time    ..........................................................................................................................................  Reviewed by Signature/Title    ...................................................              ..............................................  Date                                                            Time

## 2018-01-05 LAB
ANION GAP SERPL CALCULATED.3IONS-SCNC: 7 MMOL/L (ref 3–14)
BUN SERPL-MCNC: 9 MG/DL (ref 7–30)
CALCIUM SERPL-MCNC: 8.4 MG/DL (ref 8.5–10.1)
CHLORIDE SERPL-SCNC: 100 MMOL/L (ref 94–109)
CO2 SERPL-SCNC: 30 MMOL/L (ref 20–32)
CREAT SERPL-MCNC: 0.95 MG/DL (ref 0.66–1.25)
GFR SERPL CREATININE-BSD FRML MDRD: 83 ML/MIN/1.7M2
GLUCOSE BLDC GLUCOMTR-MCNC: 182 MG/DL (ref 70–99)
GLUCOSE BLDC GLUCOMTR-MCNC: 206 MG/DL (ref 70–99)
GLUCOSE BLDC GLUCOMTR-MCNC: 222 MG/DL (ref 70–99)
GLUCOSE BLDC GLUCOMTR-MCNC: 224 MG/DL (ref 70–99)
GLUCOSE SERPL-MCNC: 219 MG/DL (ref 70–99)
HGB BLD-MCNC: 13 G/DL (ref 13.3–17.7)
PHOSPHATE SERPL-MCNC: 3.3 MG/DL (ref 2.5–4.5)
POTASSIUM SERPL-SCNC: 4.1 MMOL/L (ref 3.4–5.3)
SODIUM SERPL-SCNC: 137 MMOL/L (ref 133–144)

## 2018-01-05 PROCEDURE — 80048 BASIC METABOLIC PNL TOTAL CA: CPT | Performed by: COLON & RECTAL SURGERY

## 2018-01-05 PROCEDURE — 36415 COLL VENOUS BLD VENIPUNCTURE: CPT | Performed by: COLON & RECTAL SURGERY

## 2018-01-05 PROCEDURE — 12000007 ZZH R&B INTERMEDIATE

## 2018-01-05 PROCEDURE — 25000132 ZZH RX MED GY IP 250 OP 250 PS 637: Performed by: COLON & RECTAL SURGERY

## 2018-01-05 PROCEDURE — 25000131 ZZH RX MED GY IP 250 OP 636 PS 637: Performed by: INTERNAL MEDICINE

## 2018-01-05 PROCEDURE — 25000128 H RX IP 250 OP 636: Performed by: COLON & RECTAL SURGERY

## 2018-01-05 PROCEDURE — 00000146 ZZHCL STATISTIC GLUCOSE BY METER IP

## 2018-01-05 PROCEDURE — 99232 SBSQ HOSP IP/OBS MODERATE 35: CPT | Performed by: INTERNAL MEDICINE

## 2018-01-05 PROCEDURE — 84100 ASSAY OF PHOSPHORUS: CPT | Performed by: COLON & RECTAL SURGERY

## 2018-01-05 PROCEDURE — 25000128 H RX IP 250 OP 636: Performed by: PHYSICIAN ASSISTANT

## 2018-01-05 PROCEDURE — 25000125 ZZHC RX 250: Performed by: COLON & RECTAL SURGERY

## 2018-01-05 PROCEDURE — 85018 HEMOGLOBIN: CPT | Performed by: COLON & RECTAL SURGERY

## 2018-01-05 RX ADMIN — INSULIN GLARGINE 7 UNITS: 100 INJECTION, SOLUTION SUBCUTANEOUS at 22:04

## 2018-01-05 RX ADMIN — CIPROFLOXACIN 400 MG: 2 INJECTION, SOLUTION INTRAVENOUS at 02:58

## 2018-01-05 RX ADMIN — METRONIDAZOLE 500 MG: 500 INJECTION, SOLUTION INTRAVENOUS at 16:11

## 2018-01-05 RX ADMIN — CIPROFLOXACIN 400 MG: 2 INJECTION, SOLUTION INTRAVENOUS at 14:25

## 2018-01-05 RX ADMIN — ENOXAPARIN SODIUM 40 MG: 40 INJECTION SUBCUTANEOUS at 13:14

## 2018-01-05 RX ADMIN — ACETAMINOPHEN 1000 MG: 10 INJECTION, SOLUTION INTRAVENOUS at 08:28

## 2018-01-05 RX ADMIN — Medication: at 14:42

## 2018-01-05 RX ADMIN — ACETAMINOPHEN 1000 MG: 10 INJECTION, SOLUTION INTRAVENOUS at 00:21

## 2018-01-05 RX ADMIN — SODIUM CHLORIDE: 9 INJECTION, SOLUTION INTRAVENOUS at 16:12

## 2018-01-05 RX ADMIN — ACETAMINOPHEN 1000 MG: 10 INJECTION, SOLUTION INTRAVENOUS at 21:02

## 2018-01-05 RX ADMIN — ACETAMINOPHEN 1000 MG: 10 INJECTION, SOLUTION INTRAVENOUS at 13:08

## 2018-01-05 RX ADMIN — METRONIDAZOLE 500 MG: 500 INJECTION, SOLUTION INTRAVENOUS at 05:58

## 2018-01-05 RX ADMIN — ATORVASTATIN CALCIUM 40 MG: 40 TABLET, FILM COATED ORAL at 22:00

## 2018-01-05 RX ADMIN — ALVIMOPAN 12 MG: 12 CAPSULE ORAL at 08:27

## 2018-01-05 NOTE — ANESTHESIA CARE TRANSFER NOTE
Patient: Edilberto Zhao    Procedure(s):  LAPAROSCOPIC ASSISTED Anterior SIGMOID RESECTION - Wound Class: II-Clean Contaminated    Diagnosis: COLON CANCER  Diagnosis Additional Information: No value filed.    Anesthesia Type:   General, ETT     Note:  Airway :Face Mask  Patient transferred to:PACU  Handoff Report: Identifed the Patient, Identified the Reponsible Provider, Reviewed the pertinent medical history, Discussed the surgical course, Reviewed Intra-OP anesthesia mangement and issues during anesthesia, Set expectations for post-procedure period and Allowed opportunity for questions and acknowledgement of understanding      Vitals: (Last set prior to Anesthesia Care Transfer)    CRNA VITALS  1/4/2018 1812 - 1/4/2018 1849      1/4/2018             Pulse: 100    SpO2: 96 %    Resp Rate (observed): (!)  7                Electronically Signed By: HENNY Engel CRNA  January 4, 2018  6:49 PM

## 2018-01-05 NOTE — BRIEF OP NOTE
[facility] Brief Operative Note    Pre-operative diagnosis: COLON CANCER   Post-operative diagnosis sigmoid colon cancer     Procedure: Procedure(s):  LAPAROSCOPIC ASSISTED Anterior SIGMOID RESECTION - Wound Class: II-Clean Contaminated   Surgeon(s): Surgeon(s) and Role:     * Chiquis Pierre MD - Primary   Estimated blood loss: 25 mL    Specimens:   ID Type Source Tests Collected by Time Destination   A : Right Thigh Lesion Tissue Other SURGICAL PATHOLOGY EXAM Chiquis Pierre MD 1/4/2018  2:20 PM    B : PERITONEAL MARGIN OFF OF BLADDER Tissue Bladder Peritoneum SURGICAL PATHOLOGY EXAM Chiquis Pierre MD 1/4/2018  5:51 PM    C : SIGMOID COLON-Tatoo is distal end of tumor Tissue Large Intestine, Sigmoid SURGICAL PATHOLOGY EXAM Chiquis Pierre MD 1/4/2018  5:56 PM    D : Anastomosis Ring Tissue Other SURGICAL PATHOLOGY EXAM Chiquis Pierre MD 1/4/2018  6:23 PM       Findings: Mid sigmoid lesion with adherence to the bladder, the disc of peritoneum from the bladder was sent as a separate specimen. There were several small 2mm lesions on the liver, these looked like scar, to small to biopsy.          IVF: 3200mL  UOP: 575mL  Condition on discharge from OR: Satisfactory    Silvio Hammer MD   [facility]   [phone].        ADDENDUM:    PATIENT DATA  Indicate Y or N: NO  Home O2 No  Hemodialysis  No  Transplant patient  No  Cirrhosis  No  Steroids in last 30 days  No  Immunomodulators in last 30 days  No  Anticoagulation at time of surgery  No   List medication n/a  Prior abdominal surgery  No  Pelvic irradiation  No    Albumin within 30 days if known unknown   Hgb within 30 days if known 13.4 No results found for: HGB]  Cr within 30 days if known unknoqn No results found for: CR]  Body mass index is 38.1 kg/(m^2).      OR DATA  Emergent  No   <24 hours  No   <1 week  No  Bowel Prep Yes  Antibiotics  Yes  DVT prophylaxis    Heparin  Yes   SCD  Yes   None   No  Drain  Yes  ASA (1,2,3,4) 2  OR time (min) 257min  Stents  No  Transfuse >/= 2U  No  Anastomosis   Stapled  Yes   Handsewn  No  Leak Test    Positive  No   Negative  Yes   Not done  No    FOR CANCER ALSO COMPLETE:  Preoperative treatment (Y or N)   Chemo  No   Radiation No   Diversion  No  CEA unknown  Metastatic disease at time of operation  No      done Route (Have a good day)

## 2018-01-05 NOTE — DISCHARGE INSTRUCTIONS
Discharge Instructions following Abdominal Surgery  [facility]      Bowel Function  After removal of a portion of the intestines, it is normal for bowel movements to be erratic.  They are often looser and more frequent.  This condition generally resolves within a few weeks or it can be controlled with diet or medication.  Diet  In general, you may return to a well-balanced diet upon discharge.  Your doctor will let you know when to add extra fiber to your diet.  Be sure to drink plenty of fluids.  Incision  You should expect some discomfort in the area of your incision, particularly as you increase your activity.  If you notice an area of increasing redness or new drainage, please call your doctor.  You may shower as desired but refrain from tub baths or swimming until the incisions are fully healed.  Activity  Gradually increase your activity each day.  There are generally no restrictions on walking, climbing stairs, or riding in a car.  You can usually drive a car 7-10 days after your leave the hospital.  Do not drive while taking narcotic pain medications.  Ask your doctor regarding resumption of physical sexual activity; in most cases, you can resume sex after a few weeks.  Your physician will advise you about when to return to work.  It is preferable to have somebody stay with you at home until you are fully healed and able to resume a normal level of activity   Medications  You will be discharged with a prescription pain medication and any medications you were taking prior to surgery.  Do not drive while taking narcotic pain medications.  If you need additional medications or a refill, you must call your doctor during normal business hours.  It is the policy of [facility] to not refill pain medication after hours or on weekends because your chart is not available.  Follow-up  Typically, you will be asked to schedule a follow-up office visit 1  to 4 weeks after surgery.  If you have any questions related to follow-up, medications, or your condition, please call the office.      Call your surgeon if you have these signs or symptoms:  ([phone])    Problem with the incision, including increasing pain, swelling, redness, or drainage    Increasing abdominal pain    Uncontrolled nausea or vomiting    Fever or chills    Constipation  (no bowel movement for 3 days)    Diarrhea (more than 3 watery stools within 24 hours)    Bleeding from the rectum, wound, or stoma    Any questions or concerns

## 2018-01-05 NOTE — OP NOTE
DATE OF PROCEDURE:  01/04/2018       PREOPERATIVE DIAGNOSIS:  Sigmoid colon cancer.      POSTOPERATIVE DIAGNOSIS:  Sigmoid colon cancer.      PROCEDURE:  Laparoscopic-assisted anterior resection.      ASSISTANT:  Silvio Hammer MD, [facility] Colorectal Surgery Fellow       INDICATIONS:  Edilberto Zhao is a 52-year-old man who had some abdominal pain.  A CT scan demonstrated some thickening in the sigmoid colon, thought to be diverticulitis, and he was recommended to have a colonoscopy.  Subsequent colonoscopy for this abdominal pain demonstrated an obstructing mass in the sigmoid colon.  Biopsies revealed moderately differentiated adenocarcinoma.  Follow-up CT scan of the chest, abdomen and pelvis did not demonstrate any evidence of metastatic disease, and there was some thickening in the sigmoid colon.  There was some hazy opacities consistent with mesentery lymph nodes.  We discussed the plan for laparoscopic-assisted, possible open sigmoid resection with the risks of bleeding, infection, alteration of bowel function, possible anastomotic leak that might require reoperation and stoma.  We discussed other risks associated with major abdominal surgery and general anesthesia, including but not limited to DVT, PE, heart attack, stroke, urinary tract infection, blood clots, damage to surrounding structures.  He understood and wished to proceed.      FINDINGS:  He had a considerable intraabdominal and mesenteric fat.  On inspecting the liver, there were two 2 mm lesions on the liver.  These seemed so small and more likely scars and were not biopsied.  Within the pelvis, the sigmoid colon in the area of the tattoo, which was slightly distal to the tumor, was adherent to the bladder.  It was unclear whether this was related to the tattoo or to the tumor itself.  This had become disrupted during our mobilization, and we elected at the end of the procedure to excise the disk of peritoneum in this area to be sure  to achieve a negative margin.      DESCRIPTION OF PROCEDURE:  After informed consent was obtained, the patient was taken to the operating room and positioned on the operating room table in the supine position.  He was intubated and a Decker catheter were placed without difficulty as was an orogastric tube.  He was then positioned in modified lithotomy position.  His right arm was tucked.  The left arm remained out on the arm board.  He was secured to the bed with wide strapping tape.  The abdomen was shaved, prepped and draped in the usual fashion.  He did have a small lesion on his right thigh, and this was excised sharply as we had discussed in the office.  We then made a 1 cm incision above the umbilicus and carried dissection down through the fascia.  The fascia was grasped and incised, and the peritoneal cavity was entered without difficulty.  An 0 Vicryl suture was placed at the level of the fascia, and the Patricia port was inserted.  Inspection revealed the small lesions on the surface of the liver that did not appear to be consequential.  The remainder of the exam was normal for the puckering of the sigmoid colon and some tattooing that was adherent to the bladder right in that area.  No other lesions were noted.  Given that this was tethered to the bladder, we elected to do a lateral to medial approach.  We began by incising the lateral attachments of the sigmoid colon, extending up to and around the splenic flexure.  This was a bit tedious as his mesentery and fascia had significant adipose tissue and rather difficult to maintain planes.  We were, however, able to get around the full hepatic flexure and enter into the lesser sac, taking the omentum off the lateral third of the transverse colon to allow for comfortable length of our anastomosis.  Once we had done this, we carried our dissection down towards the pelvis, and in trying to assess the vascular pedicle, the area where it had been adherent to the  bladder dislodged.  At this point, we felt that it was better to do the remaining portion of dissection through a Pfannenstiel incision as the splenic flexure had excellent mobilization for anastomosis.  We opened a roughly 8 cm Pfannenstiel incision with a transverse skin incision, transverse fascial incision and a vertical splitting of the muscle.  A large Roberto Carlos retractor was placed, and the small bowel was packed into the upper abdomen.  We could easily bring the entire sigmoid colon, including the tumor out, and we identified where the sigmoid descending junction came together and could see straight down towards the vascular pedicle.  A window was made in the mesentery at this level, and the bowel was divided.  The mesentery was then divided towards the vascular pedicle with the LigaSure.  We then incised the peritoneum inferior to the superior hemorrhoidal vessels and entered into the presacral plane.  This plane was developed all the way underneath.  We could identify the ureter in the retroperitoneum off of our vascular pedicle.  The vascular pedicle itself was clamped with a Ghotra-Carmalt clamp.  This was then divided and secured with an 0 Vicryl suture ligation followed by a tie.  Once we had done this, we had excellent hemostasis and were able to elevate the sigmoid colon up off of the retroperitoneum, and the presacral plane was entered.  Given the fact that this was at mid sigmoid, we identified the upper rectum and incised the peritoneum on each side and then divided the mesentery perpendicular to this using the LigaSure.  Once we had freed off the upper rectum circumferentially, this was divided using a reload of the Contour green load.  The specimen was passed off the field, demonstrating excellent proximal and distal margins and an obstructing sigmoid lesion.  We then inspected for hemostasis, which was excellent, and identified the proximal part of our anastomosis.  This easily dropped down  into the pelvis without undue tension.  It appeared nicely perfused.  We brought this up out of our Pfannenstiel incision, excising the staple line and placing a 2-0 Prolene pursestring.  A 28 anvil was secured in this area, and this was dropped back into the abdomen.  We again inspected for hemostasis, and then Dr. Hammer went below, passing a sizer up flush with the rectal stump, and then exchanged this out for the 28 stapling device.  We were easily able to guide the stapling device up and deploy the spike slightly posterior to the staple line in the center of the transverse staple line.  The anvil was docked after ensuring proper orientation with the mesentery posteriorly.  The stapling device was closed, ensuring there was no additional tissue caught in this.  This was closed without difficulty.  The stapling device was fired, and 2 intact anastomotic rings were noted on removal.  I then occluded bowel proximal to the anastomosis and filled the pelvis with water.  Dr. Hammer was able to visualize the anastomosis with the proctoscope and found it widely patent, well perfused, tension free and hemostatic.  There was no leak on air leak test.  The anastomosis was noted to be at about 10-11 cm from the anal verge.  The pelvis was suctioned free of effluent, and that was clear.  The 19 round drain was brought in through the left lower quadrant laparoscopic port site.        We then turned our attention to the area where there was tattooing on the peritoneal surface of the bladder.  This did not have any visible tumor, but given that it had been adherent in that area, we sharply excised the disk of tissue right in this area and sent this as a separate specimen labeled the peritoneal margin off of the bladder.  This was right at the dome of the bladder, and given that we had taken this sharply, point cautery was used to control bleeding.  Two interrupted 3-0 PDS sutures were used to reapproximate this area just to  reinforce in the case we had weakened the bladder wall, although we did not think we had done so.  We then oversewed that area with running 3-0 Vicryl.  At the completion of this, it was nicely hemostatic.  We then removed the Roberto Carlos wound retractor and the remainder of the ports.  All packs were removed.  We then switched to a clean closure tray and reapproximated the fascia at the umbilical port site using the previously placed 0 Vicryl suture, and this seemed nice and secure.  The peritoneum was then reapproximated vertically with a running 3-0 Vicryl and the fascia was closed transversely with an 0 looped PDS.  Copious irrigation in each layer was undertaken, and the skin was reapproximated with 4-0 Monocryl at the 3 remaining port sites and the Pfannenstiel incision.  Dermabond was then placed.  He was returned to the supine position and taken to recovery in stable condition.  Sponge and needle counts correct at the end of the case.      ESTIMATED BLOOD LOSS:  25 mL.         LARRY PIERRE MD             D: 2018 18:56   T: 2018 16:04   MT: [id number]      Name:     JOSE G DESIR   MRN:      [id number]        Account:        [id number]   :      1965           Procedure Date: 2018      Document: [id number]       cc: Jamshid Pierre MD

## 2018-01-05 NOTE — PROGRESS NOTES
[facility]  PROGRESS NOTE    January 5, 2018  Post-op Day # 1    SUBJECTIVE:  The patient is doing well. His pain is controlled. He denies nausea or vomiting. He has tolerated clear liquids thus far.     OBJECTIVE:  Temp:  [97.4  F (36.3  C)-98.4  F (36.9  C)] 97.6  F (36.4  C)  Heart Rate:  [67-90] 67  Resp:  [8-29] 18  BP: (112-138)/(68-85) 112/72  SpO2:  [88 %-100 %] 97 %    Intake/Output Summary (Last 24 hours) at 01/05/18 1005  Last data filed at 01/05/18 0600   Gross per 24 hour   Intake             5529 ml   Output             2640 ml   Net             2889 ml       GENERAL:  Awake, alert, no acute distress,   HEAD - Nomocephalic atraumatic  SCLERA anicteric  EXTREMITIES warm and well perfused  ABDOMEN:  Soft, appropriately tender, non-distended,   INCISION:  C/d/i, ELISA with serosanguinous drainage.     LABS:  No results found for: WBC  Lab Results   Component Value Date    HGB 13.0 01/05/2018     No results found for: HCT  Lab Results   Component Value Date     01/04/2018     Last Basic Metabolic Panel:  Lab Results   Component Value Date     01/05/2018      Lab Results   Component Value Date    POTASSIUM 4.1 01/05/2018     Lab Results   Component Value Date    CHLORIDE 100 01/05/2018     Lab Results   Component Value Date    FREDERICK 8.4 01/05/2018     Lab Results   Component Value Date    CO2 30 01/05/2018     Lab Results   Component Value Date    BUN 9 01/05/2018     Lab Results   Component Value Date    CR 0.95 01/05/2018     Lab Results   Component Value Date     01/05/2018       ASSESSMENT/PLAN:POD#1 lap sigmoid colectomy for colon cancer.   1. Full liquids  2. Continue PCA  3. IVF at 50cc/hr  4. Lovenox for prophylaxis and teaching for d/c  5. OOB, ambulate  6. Await return of bowel function  7. Continue ELISA drain  8. Continue farias    Ophelia Menon PA-C  [facility]  Phone: [phone]

## 2018-01-05 NOTE — PHARMACY-CONSULT NOTE
Alvimopan (Entereg) Pharmacy Consult    Pharmacy has been consulted to review this patient and determine if they are an appropriate candidate for post-operative use of alvimopan.    Current approved uses for alvimopan include:  laproscopic, open, or converted partial bowel resection.    The following criteria must be met to be a post-operative candidate for alvimopan:    Must have received pre-operative dose of alvimopan    Surgery performed was an open, laproscopic, or converted partial bowel resection with primary anastomisis.    Patient must not have been on chronic narcotics before surgery  o Alvimopan contraindicated if patient has received therapeutic doses of opioids for >7 consecutive days before surgery  o Alvimopan not recommended if patient has received >3 opioid doses in the last 7 days before surgery (increase the risk of patient developing significant GI side effects)    No history of myocardial infarction    No bowel obstruction present (or recent surgery for bowel obstruction)    No End-stage renal disease present    No Severe hepatic impairment present    Patient did not have a pancreatic or gastric anastomosis    Ordering provider has received alvimopan educational materials    This patient does meet the criteria for appropriate use of alvimopan.    Dosing recommendation:  Take one 12 mg capsule by mouth twice daily for up to 15 total doses    1st dose given 30 minutes to 5 hours prior to surgery    Then one 12 mg capsule twice daily for up to 14 additional doses    STOP Alvimopan as soon as GI function returns (upon 1st bowel movement)    May NOT be continued as an outpatient  Note: Alvimopan is a hard gelatin capsule which may not be crushed or opened

## 2018-01-05 NOTE — ANESTHESIA POSTPROCEDURE EVALUATION
Patient: Edilberto Zhao    Procedure(s):  LAPAROSCOPIC ASSISTED Anterior SIGMOID RESECTION - Wound Class: II-Clean Contaminated    Diagnosis:COLON CANCER  Diagnosis Additional Information: No value filed.    Anesthesia Type:  General, ETT    Note:  Anesthesia Post Evaluation    Patient location during evaluation: PACU  Patient participation: Able to fully participate in evaluation  Level of consciousness: awake  Pain management: adequate  Airway patency: patent  Cardiovascular status: acceptable  Respiratory status: acceptable  Hydration status: acceptable  PONV: none     Anesthetic complications: None          Last vitals:  Vitals:    01/04/18 1930 01/04/18 1935 01/04/18 1940   BP: 133/82 133/82 123/78   Resp: 15 8 16   Temp: 36.8  C (98.2  F) 36.8  C (98.2  F) 36.9  C (98.4  F)   SpO2: 95% 95% 94%         Electronically Signed By: Citlaly Jimenez  January 4, 2018  7:44 PM

## 2018-01-05 NOTE — CONSULTS
Received standard post-op orders for low residue diet education.  Chart reviewed, pt had a LAPAROSCOPIC ASSISTED Anterior SIGMOID RESECTION yesterday, 14.     Pt is starting full liquids. Suspect will advance to low residue then regular diet as ADAT.  No education planned at this time. If pt to remain on low residue at d/c, please re-consult.    Katherine Valadez RD  Pager [phone] (M-F)            [phone] ([facility])

## 2018-01-05 NOTE — PROGRESS NOTES
"[facility]  Hospitalist Progress Note  Arie Ibarra MD  01/05/2018    Assessment & Plan   Edilberto Zhao is a 52 year old male with past medical history of DM 2, erectile dysfunction, hyperlipidemia, hypertension, benign thyroid nodule and recently diagnosed sigmoid colon adenocarcinoma.  He is s/p  laparoscopic-assisted anterior sigmoid resection for a sigmoid adenocarcinoma on 1/4/18. hospitalist consulted for medical management    Status post lap-assisted anterior sigmoid resection for sigmoid colon cancer on 1/4/18, POD#1.  -- routine post op care per surgery including pain control, diet advancement,     DM 2 with postoperative hyperglycemia   -- PTA regimen Lantus 15 u qhs and metformin  -- BG running in the 200s and currently on full liquid diet  -- add Lantus 7 units qhs and uptitrate as tolerated  -- continue with ssi    Postoperative hypoxia: resolved  -- continue IS    HTN   -- currently well controlled, will hold off on restarting his acei/hctz now.   -- Preop stress echo reviewed w/ normla LVEF 50-55%, mildly dilated LV  -- restart BP as indicated    DVT Prophylaxis: lovenox    Disposition: per primary service    Interval History   Doing well. Pain controlled. Tolerating full liquid diet.     -Data reviewed today: I reviewed all new labs and imaging over the last 24 hours. I personally reviewed no images or EKG's today.    Physical Exam   Heart Rate: 67, Blood pressure 114/65, temperature 97.8  F (36.6  C), temperature source Oral, resp. rate 18, height 1.753 m (5' 9\"), weight 117 kg (258 lb), SpO2 97 %.  Vitals:    01/04/18 1207   Weight: 117 kg (258 lb)     Vital Signs with Ranges  Temp:  [97.6  F (36.4  C)-98.4  F (36.9  C)] 97.8  F (36.6  C)  Heart Rate:  [67-90] 67  Resp:  [8-29] 18  BP: (112-138)/(65-85) 114/65  SpO2:  [88 %-97 %] 97 %  I/O's Last 24 hours  I/O last 3 completed shifts:  In: 6043 [P.O.:920; I.V.:5123]  Out: 3590 [Urine:3475; Drains:90; " Blood:25]    Constitutional: Alert, awake and no apparent distress   Respiratory: Clear to auscultation bilaterally  Cardiovascular: regular rate and rhythm  GI: soft, appropriate tenderness, jailyn drain in place  Skin/Integumen: warm and dry      Medications   All medications I am responsible for were reviewed.    NaCl 30 mL/hr at 01/05/18 1424       atorvastatin (LIPITOR) tablet 40 mg  40 mg Oral At Bedtime     sodium chloride (PF)  3 mL Intracatheter Q8H     enoxaparin  40 mg Subcutaneous Q24H     ciprofloxacin  400 mg Intravenous Q12H     metroNIDAZOLE  500 mg Intravenous Q8H     HYDROmorphone   Intravenous PCA     acetaminophen  1,000 mg Intravenous Q6H     influenza quadrivalent (PF) vacc age 3 yrs and older  0.5 mL Intramuscular Prior to discharge     insulin aspart  1-7 Units Subcutaneous TID AC     insulin aspart  1-5 Units Subcutaneous At Bedtime        Data     Recent Labs  Lab 01/05/18  0745 01/04/18  2130 01/04/18  1132   HGB 13.0*  --   --    PLT  --  196  --     133  --    POTASSIUM 4.1 3.9 3.4   CHLORIDE 100 98  --    CO2 30 27  --    BUN 9 11  --    CR 0.95 0.98  --    ANIONGAP 7  --   --    FREDERICK 8.4* 8.5  --    * 269* 225*       No results found for this or any previous visit (from the past 24 hour(s)).

## 2018-01-05 NOTE — CONSULTS
DATE OF SERVICE:  01/04/2018      PRIMARY CARE PHYSICIAN:  None stated      REQUESTING PHYSICIAN:  Chiquis Pierre MD      REASON FOR CONSULTATION:  Assist with medical management in the postoperative state.      HISTORY OF PRESENT ILLNESS:  Mr. Edilberto Marcum is a 52-year-old gentleman with past medical history of DM 2, erectile dysfunction, hyperlipidemia, hypertension, benign thyroid nodule and recently diagnosed sigmoid colon adenocarcinoma that was found when he began having some abdominal pain for 1month.  A workup resulted in colonoscopy that found an obstructing mass with biopsy resulting as moderately differentiated adenocarcinoma.  The pain was ongoing and variable, but he did not require any opioid analgesics, only NSAIDs for pain relief.  He also denied any specific bowel changes.  Today, he underwent a laparoscopic-assisted anterior sigmoid resection for his sigmoid colon cancer by Colorectal Surgery.  The duration of the procedure was 5 hours.  He received general endotracheal anesthesia.  He was hemodynamically stable during the course with blood pressures ranging from 90s-130s.  He received perioperative subcu heparin, ciprofloxacin, metronidazole and had 3300 mL of crystalloid, 250 mL of albumin, 575 mL of urine and EBL of 25 mL.  At the end of his course in the PACU, the patient was hyperglycemic at 281.  He was given 4 units of regular insulin SC at that time.  He does note that he checks his blood sugar twice a day at home usually runs in the 200s.  Last A1c was 8.4% 1 week ago and just last week started insulin in addition to metformin and notably that was Basaglar long-acting insulin.  The hospitalist service was asked to assist with medical management in the postoperative period.      PAST MEDICAL HISTORY:  As per HPI.      PAST SURGICAL HISTORY:  [place] teeth extraction.  No other surgeries.      ALLERGIES:  None.      SOCIAL HISTORY:  The patient resides in [place] with his wife, Rosa Isela, and 3  young adult children.  He works as a  for [employer].  He is a never smoker, drinks alcohol only very occasionally, nondrug user.      FAMILY HISTORY:  One sister with thyroid disease.  Mother alive with high cholesterol and stroke.  Dad alive with DM 2, hypertension, high cholesterol.  No family medical history of malignancies.  Children are alive and well.        MEDICATIONS:   Prior to Admission medications    Medication Sig Last Dose Taking? Auth Provider   Sildenafil Citrate (VIAGRA PO) Take 100 mg by mouth daily as needed more than a week at  prn Yes Reported, Patient   multivitamin, therapeutic with minerals (THERA-VIT-M) TABS tablet Take 1 tablet by mouth daily 12/27/2017 Yes Reported, Patient   ASPIRIN PO Take 81 mg by mouth daily more than a week at prn Yes Reported, Patient   Insulin Glargine (BASAGLAR KWIKPEN SC) Inject 15 Units Subcutaneous every evening  1/3/2018 at pm Yes Reported, Patient   lisinopril-hydrochlorothiazide (PRINZIDE/ZESTORETIC) 20-25 MG per tablet Take 1 tablet by mouth every evening 1/3/2018 at pm Yes Reported, Patient   METFORMIN HCL PO Take 1,000 mg by mouth 2 times daily 12/28/2017 Yes Reported, Patient   ATORVASTATIN CALCIUM PO Take 40 mg by mouth At Bedtime  1/1/2018 at hs Yes Reported, Patient   Omega-3 Fatty Acids (OMEGA 3 PO) Take 2 tablets by mouth 2 times daily 12/28/2017 Yes Reported, Patient   Cetirizine HCl (ZYRTEC PO) Take 10 mg by mouth daily as needed more than a month at prn Yes Reported, Patient   fluticasone (FLONASE) 50 MCG/ACT spray Spray 1-2 sprays into both nostrils daily as needed for rhinitis or allergies more than a month at prn Yes Reported, Patient   Naproxen Sodium (ALEVE PO) Take 2 tablets by mouth daily as needed for moderate pain more than a week at prn Yes Reported, Patient        REVIEW OF SYSTEMS:   CONSTITUTIONAL:  Negative for fevers, chills, recent hospitalizations.   CARDIOVASCULAR:  Negative for chest pain, palpitations, heaviness,  tightness.  No lower extremity edema.  Reports a 60-70 pound weight loss in the last year which was intentional.  In the last 48 hours, approximately 5-pound weight loss with the preoperative prep.    GASTROINTESTINAL:  No specific diet that he follows.  Remainder as per HPI.  No nausea and vomiting.   PULMONARY:  No shortness of breath, no cough, no sore throat, no dyspnea on exertion.   GENITOURINARY:  No dysuria, no frequency difficulties.   INTEGUMENT:  Longstanding eczema for which he applies a cream.   NEUROLOGIC:  No recent falls, trauma, headache, vision changes or syncopal episodes.   ENDOCRINE:  No heat or cold intolerance.  Remainder as per HPI.   MUSCULOSKELETAL:  Chronic right knee pain from his work.      PHYSICAL EXAMINATION:   CENTRAL NERVOUS SYSTEM:  RASS0.  Follows commands to show 2 fingers and wiggle feet bilaterally.  Speech is fluent.   HEENT:  Head is normocephalic, atraumatic.  Pupils 3 mm and briskly reactive bilaterally.  Sclerae nonicteric, noninjected.  Mouth has dry oral mucosa.  Tongue is pink-tinged from a popsicle.   NECK:  Supple.  No supraclavicular lymphadenopathy, possible 1 palpable lymph node in the submandibular area on the L.   CARDIOVASCULAR:  S1, S2, no murmurs, normotensive.   LUNGS:  Clear to auscultation bilateral upper lobes, gets upwards of 2 liters on incentive spirometer.   ABDOMEN:  Hypo to absent bowel sounds.  Soft, nontender, nondistended.  Lap sites x2 in the right lower quadrant, 1 in the periumbilical area.  Those are all open to air.  One site on the left is covered with a dressing and coming from it is a ELISA drain with serosanguineous drainage.  There is also a dressing just distal to the inguinal fold on the right.   EXTREMITIES:  Without edema.  No mottling.   VITAL SIGNS:  Heart rate 77, blood pressure 123/80, saturations 87-91% on 1 liter nasal cannula, end tidal CO2 of 38, respiratory rate 19-20, temperature 36.9.      IMPRESSION:  Hospital day 1, postop  day 0 status post laparoscopic-assisted anterior sigmoid resection for a sigmoid adenocarcinoma in the setting of the above-described medical comorbidities.      PROBLEM LISTS AND PLAN:   1.  Postop day 0 status post lap-assisted anterior sigmoid resection for sigmoid colon cancer.  Analgesia with Dilaudid PCA and scheduled IV acetaminophen.  Also agree with continuation of ciprofloxacin and metronidazole.  Diet and remainder of postsurgical care as per the colorectal surgical team.   2.  DM 2 with postoperative hyperglycemia as high as 281. We started him on a sliding scale insulin and will check before meals, at bedtime and at 2:00 a.m.  I would like to follow the glucose trend prior to initiating his long-acting insulin, particularly with just being on clear liquids and uncertainty of how much he is going to eat, so the initiation of long-acting insulin can potentially be revisited on 01/05/2018.  Will check electrolytes now just to rule out DKA w/ hyperglycemia  3.  Mild postoperative hypoxia with O2 requirement of 1 liter, needing to be increased to 3L.  Incentive spirometry for presumed post op atelecatsis.  IS  Education is provided.  Continue supplemental O2. Mobilize asap  4.  We have added electrolyte replacement protocols and will replete his hypomagnesemia at 1.7.  Notably, he is also mildly hyponatremic.  We can change his IV fluids to NS in lieu of LR with his hyponatremia.    5.  There are no acute cardiovascular, neurologic, infectious or hematologic issues.   5.  He has adequate peripheral IV access with at least 1 catheter.   6.  He is receiving prophylaxis with enoxaparin in addition to the perioperative dose of subcu heparin he received, he is a somewhat high risk for VTE given his long surgical course and his malignancy.  We have encouraged him to be out of bed as soon as possible.  The plan is to dangle tonight.  He also has SCDs on.     7.  HTN currently well controlled, will hold off on  restarting his acei/hctz now, reeval in am. Preop stress echo reviewed w/ normla LVEF 50-55%, mildly dilated LV     The hospitalist service will continue to follow his glycemic and BP control throughout the duration of the hospitalization.      We thank you for this interesting consult.      Total time is 40 minutes from 2135 until 221.              As dictated by HENNY LIN            D: 2018 22:17   T: 2018 23:29   MT:       Name:     JOSE G DESIR   MRN:      [id number]        Account:       [id number]   :      1965           Consult Date:  2018      Document: [id number]       cc: Chiquis Pierre MD

## 2018-01-05 NOTE — PLAN OF CARE
Problem: Patient Care Overview  Goal: Plan of Care/Patient Progress Review  Outcome: Improving  A & O, VSS on 2 L O2 via NC, BS +, flatus -, no BM. Adequate urine output via farias. Lap sites CDI, LAURA. ELISA drain, to suction, 40 mL serosanguineous. LS clear. Pulses 2+. Tolerating clear liquid diet. Ambulates with assist of 1. Pain managed with PCA and scheduled IV acetaminophen.

## 2018-01-05 NOTE — PLAN OF CARE
Problem: Patient Care Overview  Goal: Plan of Care/Patient Progress Review  Outcome: Improving  A/Ox4. VSS. Pt weaned of O2 this afternoon, sats in mid/upper 90s. Pt up with Ax1. Pt ambulated to bathroom and in hallway. Pt has minimal pain increases during ambulation. Pt has PCA pump. IV fluids running at 30 ml/h. Incisions open to air, no redness/drainage noted. Decker intact. ELISA drain serosanguinous fluid.  Pt had BM this afternoon, entereg discontinued. Will continue to monitor.

## 2018-01-06 VITALS
HEART RATE: 73 BPM | WEIGHT: 258 LBS | SYSTOLIC BLOOD PRESSURE: 137 MMHG | OXYGEN SATURATION: 97 % | RESPIRATION RATE: 16 BRPM | HEIGHT: 69 IN | DIASTOLIC BLOOD PRESSURE: 85 MMHG | TEMPERATURE: 98.2 F | BODY MASS INDEX: 38.21 KG/M2

## 2018-01-06 LAB
GLUCOSE BLDC GLUCOMTR-MCNC: 200 MG/DL (ref 70–99)
GLUCOSE BLDC GLUCOMTR-MCNC: 203 MG/DL (ref 70–99)
GLUCOSE BLDC GLUCOMTR-MCNC: 205 MG/DL (ref 70–99)
GLUCOSE BLDC GLUCOMTR-MCNC: 228 MG/DL (ref 70–99)

## 2018-01-06 PROCEDURE — 90686 IIV4 VACC NO PRSV 0.5 ML IM: CPT | Performed by: COLON & RECTAL SURGERY

## 2018-01-06 PROCEDURE — 25000128 H RX IP 250 OP 636: Performed by: COLON & RECTAL SURGERY

## 2018-01-06 PROCEDURE — 99233 SBSQ HOSP IP/OBS HIGH 50: CPT | Performed by: INTERNAL MEDICINE

## 2018-01-06 PROCEDURE — 00000146 ZZHCL STATISTIC GLUCOSE BY METER IP

## 2018-01-06 PROCEDURE — 25000132 ZZH RX MED GY IP 250 OP 250 PS 637: Performed by: COLON & RECTAL SURGERY

## 2018-01-06 RX ORDER — IBUPROFEN 600 MG/1
600 TABLET, FILM COATED ORAL EVERY 6 HOURS PRN
Status: DISCONTINUED | OUTPATIENT
Start: 2018-01-06 | End: 2018-01-06 | Stop reason: HOSPADM

## 2018-01-06 RX ORDER — OXYCODONE HYDROCHLORIDE 5 MG/1
5 TABLET ORAL EVERY 4 HOURS PRN
Status: DISCONTINUED | OUTPATIENT
Start: 2018-01-06 | End: 2018-01-06 | Stop reason: HOSPADM

## 2018-01-06 RX ORDER — OXYCODONE HYDROCHLORIDE 5 MG/1
5 TABLET ORAL EVERY 4 HOURS PRN
Qty: 30 TABLET | Refills: 0 | Status: ON HOLD | OUTPATIENT
Start: 2018-01-06 | End: 2018-09-14

## 2018-01-06 RX ORDER — ACETAMINOPHEN 325 MG/1
650 TABLET ORAL EVERY 4 HOURS PRN
Status: DISCONTINUED | OUTPATIENT
Start: 2018-01-06 | End: 2018-01-06 | Stop reason: HOSPADM

## 2018-01-06 RX ADMIN — INFLUENZA A VIRUS A/MICHIGAN/45/2015 X-275 (H1N1) ANTIGEN (FORMALDEHYDE INACTIVATED), INFLUENZA A VIRUS A/HONG KONG/4801/2014 X-263B (H3N2) ANTIGEN (FORMALDEHYDE INACTIVATED), INFLUENZA B VIRUS B/PHUKET/3073/2013 ANTIGEN (FORMALDEHYDE INACTIVATED), AND INFLUENZA B VIRUS B/BRISBANE/60/2008 ANTIGEN (FORMALDEHYDE INACTIVATED) 0.5 ML: 15; 15; 15; 15 INJECTION, SUSPENSION INTRAMUSCULAR at 12:19

## 2018-01-06 RX ADMIN — ACETAMINOPHEN 1000 MG: 10 INJECTION, SOLUTION INTRAVENOUS at 02:32

## 2018-01-06 RX ADMIN — METFORMIN HYDROCHLORIDE 500 MG: 500 TABLET, FILM COATED ORAL at 10:03

## 2018-01-06 RX ADMIN — ENOXAPARIN SODIUM 40 MG: 40 INJECTION SUBCUTANEOUS at 11:21

## 2018-01-06 NOTE — PROGRESS NOTES
Pt DC to home per MD orders. Wife to drive the pt home.  VSS.  Pt stated she understood DC instructions including medication instructions.  Pt dressed himself and packed his own belongings.

## 2018-01-06 NOTE — PROGRESS NOTES
"Colon & Rectal Surgery Progress Note             Interval History:   Postop Day #: 1  Doing well, up walking. No nausea, tolerating fulls. + BM and flatus.                Medications:   I have reviewed this patient's current medications               Physical Exam:   Blood pressure 113/75, temperature 98  F (36.7  C), temperature source Oral, resp. rate 18, height 1.753 m (5' 9\"), weight 117 kg (258 lb), SpO2 98 %.    Intake/Output Summary (Last 24 hours) at 01/05/18 1927  Last data filed at 01/05/18 1424   Gross per 24 hour   Intake             2743 ml   Output             3190 ml   Net             -447 ml     GEN:  alert  ABD:  Soft, incisions, cdi         Data:        Lab Results   Component Value Date     01/05/2018    Lab Results   Component Value Date    CHLORIDE 100 01/05/2018    Lab Results   Component Value Date    BUN 9 01/05/2018      Lab Results   Component Value Date    POTASSIUM 4.1 01/05/2018    Lab Results   Component Value Date    CO2 30 01/05/2018    Lab Results   Component Value Date    CR 0.95 01/05/2018    CR 0.98 01/04/2018        Lab Results   Component Value Date    HGB 13.0 (L) 01/05/2018     Lab Results   Component Value Date     01/04/2018     No results found for: WBC         Assessment and Plan:   Advance to fulls.  Oral pain meds. SL IV  in AM  Await bowel function, pathology  ADAM farias in AM, possible DC sat or Sunday as tolerated.       Chiquis Pierre MD  [facility]  Office Phone # [phone]    "

## 2018-01-06 NOTE — PROGRESS NOTES
COLON & RECTAL SURGERY  PROGRESS NOTE    January 6, 2018  Post-op Day # 2 lap sigmoid colectomy    SUBJECTIVE:  Doing well.  Passing flatus.  No n/v.  Minimal pain.  Anxious to go home.     OBJECTIVE:  Temp:  [97.8  F (36.6  C)-98.5  F (36.9  C)] 98.5  F (36.9  C)  Pulse:  [73] 73  Heart Rate:  [67-79] 79  Resp:  [16-18] 16  BP: (113-130)/(65-87) 114/86  SpO2:  [95 %-98 %] 97 %    Intake/Output Summary (Last 24 hours) at 01/06/18 0936  Last data filed at 01/06/18 0804   Gross per 24 hour   Intake             2276 ml   Output             3570 ml   Net            -1294 ml       GENERAL:  Awake, alert, no acute distress  ABDOMEN:  Soft, appropriately tender, non-distended  INCISION:  C/d/i    LABS:  No results found for: WBC  Lab Results   Component Value Date    HGB 13.0 01/05/2018     No results found for: HCT  Lab Results   Component Value Date     01/04/2018     Last Basic Metabolic Panel:  Lab Results   Component Value Date     01/05/2018      Lab Results   Component Value Date    POTASSIUM 4.1 01/05/2018     Lab Results   Component Value Date    CHLORIDE 100 01/05/2018     Lab Results   Component Value Date    FREDERICK 8.4 01/05/2018     Lab Results   Component Value Date    CO2 30 01/05/2018     Lab Results   Component Value Date    BUN 9 01/05/2018     Lab Results   Component Value Date    CR 0.95 01/05/2018     Lab Results   Component Value Date     01/05/2018       ASSESSMENT/PLAN: POD#2 lap sigmoid colectomy  1. Low fiber diet (for 2-3 weeks)  2. Oral pain meds prn  3. D/c to home  4. D/c drain prior to d/c to home. Cover drain site with bandaid.  Ok to shower and allow soap and water to wash over incisions an drain site    Chiquis Crouch MD  [facility]  Pager:  [phone]  Phone: [phone]  Fax: [phone]

## 2018-01-06 NOTE — PROGRESS NOTES
[facility]    Hospitalist Progress Note    Date of Service (when I saw the patient): 01/06/2018    Assessment & Plan   Edilberto Zhao is a 52 year old male with hx of DM2, HTN, and recently diagnosed sigmoid colon adenocarcinoma who was admitted to the colorectal surgery service on 1/4/2018 for laparoscopic-assisted anterior sigmoid resection. Hospitalist service was consulted for diabetes management.    Status post lap-assisted anterior sigmoid resection for sigmoid colon cancer on 1/4/18, POD#1.  Tolerated procedure with no immediate complications and minimal EBL. There were 2 mm lesions noted on the liver, but they were too small to biopsy, and noted to look like scars.  - Post-op cares, pain control, and VTE prophylaxis as per CRS  - On ciprofloxacin and metronidazole as per CRS  - Biopsies (bladder, peritoneum, sigmoid colon, anastamosis ring) pending     DM 2 with postoperative hyperglycemia   [PTA: metformin 1000 mg BID, Lantus 15 units qhs]  - Blood sugars mid 200s over last 24 hours  - Resume metformin 500 mg BID, OK to resume home dose at discharge  - High SSI available      Essential hypertension   - BP within normal range  - Holding lisinopril- HCTZ during immediate post-op period, OK to resume at discharge    FEN: Low fiber diet  DVT Prophylaxis: Enoxaparin (Lovenox) SQ as per CRS  Code Status: Full Code    Disposition: Expected discharge as per CRS. Hospitalist service will sign off. Please re-consult our service with any questions or concerns    Sendy Lucero    Interval History   No events overnight. Denies pain or nausea. Tolerating low fiber diet. Has been off metformin for a week. Discussed potential side effects with patient, and he wants to resume home dose at discharge stating he can tolerate diarrhea.  - Increase Lantus to PTA dose  - Metformin resumed at 500 mg BID per CRS, OK to resume home dose at discharge  - OK to resume lisinopril and HCTZ at discharge  - We will  sign off. Thank you     -Data reviewed today: I reviewed all new labs and imaging results over the last 24 hours. I personally reviewed no images or EKG's today.    Physical Exam   Temp: 98.5  F (36.9  C) Temp src: Oral BP: 114/86 Pulse: 73 Heart Rate: 79 Resp: 16 SpO2: 97 % O2 Device: None (Room air) Oxygen Delivery: 2.5 LPM  Vitals:    01/04/18 1207   Weight: 117 kg (258 lb)     Vital Signs with Ranges  Temp:  [97.8  F (36.6  C)-98.5  F (36.9  C)] 98.5  F (36.9  C)  Pulse:  [73] 73  Heart Rate:  [67-79] 79  Resp:  [16-18] 16  BP: (113-130)/(65-87) 114/86  SpO2:  [95 %-98 %] 97 %  I/O last 3 completed shifts:  In: 2185 [P.O.:550; I.V.:1635]  Out: 3220 [Urine:3150; Drains:70]    Constitutional: Appears comfortable, NAD  Respiratory: Breathing non-labored. Lungs CTAB - no wheezes, crackles, or rhonchi  Cardiovascular: Heart RRR, no m/r/g. No pedal edema  GI: +BS, abd soft/NT  Skin/Integumen: No rash  Neuro: Alert and appropriate, BAILEY  Psych: Calm and cooperative    Medications        insulin glargine  15 Units Subcutaneous At Bedtime     insulin aspart  1-10 Units Subcutaneous TID AC     insulin aspart  1-7 Units Subcutaneous At Bedtime     metFORMIN  500 mg Oral BID w/meals     atorvastatin (LIPITOR) tablet 40 mg  40 mg Oral At Bedtime     sodium chloride (PF)  3 mL Intracatheter Q8H     enoxaparin  40 mg Subcutaneous Q24H     influenza quadrivalent (PF) vacc age 3 yrs and older  0.5 mL Intramuscular Prior to discharge     Data     Recent Labs  Lab 01/05/18  0745 01/04/18  2130 01/04/18  1132   HGB 13.0*  --   --    PLT  --  196  --     133  --    POTASSIUM 4.1 3.9 3.4   CHLORIDE 100 98  --    CO2 30 27  --    BUN 9 11  --    CR 0.95 0.98  --    ANIONGAP 7  --   --    FREDERICK 8.4* 8.5  --    * 269* 225*       No results found for this or any previous visit (from the past 24 hour(s)).

## 2018-01-06 NOTE — PLAN OF CARE
Problem: Patient Care Overview  Goal: Plan of Care/Patient Progress Review  Outcome: Improving  A/O x4, vss. LS clear - pt on RA. BS active, passing gas. Assist x1. Decker d/c - due to void. Incision open to air.

## 2018-01-08 LAB — COPATH REPORT: NORMAL

## 2018-01-08 NOTE — DISCHARGE SUMMARY
[facility] Discharge Summary      Edilberto Zhao MRN# [id number]   Age: 52 year old YOB: 1965     Date of Admission:  1/4/2018  Date of Discharge::  1/6/2018 12:52 PM  Admitting Physician:  Chiquis Pierre MD  Discharge Physician:  Chiquis Pierre MD     PCP:  Carlie Costa    Disposition: Patient discharged from [facility] to home in stable condition.        Primary Diagnosis:   Sigmoid T3N1 colon cancer         Discharge Medications:   Discharge Medication List as of 1/6/2018 12:25 PM      START taking these medications    Details   oxyCODONE IR (ROXICODONE) 5 MG tablet Take 1 tablet (5 mg) by mouth every 4 hours as needed for moderate to severe pain, Disp-30 tablet, R-0, Local Print      enoxaparin (LOVENOX) 40 MG/0.4ML injection Inject 0.4 mLs (40 mg) Subcutaneous every 24 hours, Disp-28 Syringe, R-0, E-Prescribe         CONTINUE these medications which have NOT CHANGED    Details   Sildenafil Citrate (VIAGRA PO) Take 100 mg by mouth daily as needed, Historical      multivitamin, therapeutic with minerals (THERA-VIT-M) TABS tablet Take 1 tablet by mouth daily, Historical      ASPIRIN PO Take 81 mg by mouth daily, Historical      Insulin Glargine (BASAGLAR KWIKPEN SC) Inject 15 Units Subcutaneous every evening , Historical      lisinopril-hydrochlorothiazide (PRINZIDE/ZESTORETIC) 20-25 MG per tablet Take 1 tablet by mouth every evening, Historical      METFORMIN HCL PO Take 1,000 mg by mouth 2 times daily, Historical      ATORVASTATIN CALCIUM PO Take 40 mg by mouth At Bedtime , Historical      Omega-3 Fatty Acids (OMEGA 3 PO) Take 2 tablets by mouth 2 times daily, Historical      Cetirizine HCl (ZYRTEC PO) Take 10 mg by mouth daily as needed, Historical      fluticasone (FLONASE) 50 MCG/ACT spray Spray 1-2 sprays into both nostrils daily as needed for rhinitis or allergies, Historical      Naproxen Sodium (ALEVE PO) Take 2 tablets by mouth daily as needed for  moderate pain, Historical         STOP taking these medications       NEOMYCIN SULFATE PO Comments:   Reason for Stopping:         METRONIDAZOLE PO Comments:   Reason for Stopping:                      Follow Up, Special Instructions:   Discharge diet: Low residue   Discharge activity: No straining, lifting greater than 15-20lbs, or strenuous exercise for 6 weeks.    Discharge follow-up: Follow up with Dr. Pierre in 3-4 weeks   Wound care: Keep wound clean and dry              Procedures:   Procedure(s): Laparoscopic-assisted anterior resection       No other procedures performed during this admission            Consultations:   Hospitalist          Brief Hospital Summary:   Patient is a 52 year old man whom underwent laparoscopic-assisted anterior resection on 1/4/18 by Dr. Pierre.   There were no immediate complications during this procedure.    Please refer to the full operative summary for details.  The patient's hospital course was unremarkable. Diet was advanced with return of bowel function. Pain medications were transitioned from IV to oral eventually. At the time of discharge, he was voiding freely, tolerating diet, and pain was well controlled with oral pain medications.          Attestation:  I have reviewed today's vital signs, notes, medications, labs and imaging.    Alyssa Gil PA-C  [facility]          ADDENDUM:  Length of stay: 2 days  Indicate Y or N for the following:  UTI  No  C diff  No  PNA  No  SSI No  DVT No  PE  No  CVA No  MI No  Enterocutaneous fistula  No  Peripheral nerve injury  No  Abscess (not adjacent to anastomosis)  No  Leak No    Treated with:   Antibiotics N/A   Drain  N/A   Reoperation  N/A  Death within 30 days No  Reintubation  No  Reoperation  No   Procedure     FOR CANCER CASES: path pending  T stage: 3  N stage: 1   Total number of nodes: 12   Total positive: 3  M stage: n/a  R:   TME grade, if known (1,2,3):   MSI (pos, neg):

## 2018-01-25 ENCOUNTER — TRANSFERRED RECORDS (OUTPATIENT)
Dept: HEALTH INFORMATION MANAGEMENT | Facility: CLINIC | Age: 53
End: 2018-01-25

## 2018-02-01 ENCOUNTER — APPOINTMENT (OUTPATIENT)
Dept: INTERVENTIONAL RADIOLOGY/VASCULAR | Facility: CLINIC | Age: 53
End: 2018-02-01
Attending: INTERNAL MEDICINE
Payer: COMMERCIAL

## 2018-02-01 ENCOUNTER — HOSPITAL ENCOUNTER (OUTPATIENT)
Facility: CLINIC | Age: 53
Discharge: HOME | End: 2018-02-01
Attending: RADIOLOGY | Admitting: RADIOLOGY
Payer: COMMERCIAL

## 2018-02-01 VITALS
BODY MASS INDEX: 37.33 KG/M2 | HEIGHT: 69 IN | RESPIRATION RATE: 16 BRPM | TEMPERATURE: 98.1 F | HEART RATE: 63 BPM | DIASTOLIC BLOOD PRESSURE: 65 MMHG | OXYGEN SATURATION: 95 % | WEIGHT: 252 LBS | SYSTOLIC BLOOD PRESSURE: 105 MMHG

## 2018-02-01 DIAGNOSIS — Z51.89 TREATMENT: ICD-10-CM

## 2018-02-01 LAB
APTT PPP: 34 SEC (ref 22–37)
ERYTHROCYTE [DISTWIDTH] IN BLOOD BY AUTOMATED COUNT: 14.1 % (ref 10–15)
HCT VFR BLD AUTO: 36.8 % (ref 40–53)
HGB BLD-MCNC: 12.8 G/DL (ref 13.3–17.7)
INR PPP: 0.92 (ref 0.86–1.14)
MCH RBC QN AUTO: 29.3 PG (ref 26.5–33)
MCHC RBC AUTO-ENTMCNC: 34.8 G/DL (ref 31.5–36.5)
MCV RBC AUTO: 84 FL (ref 78–100)
PLATELET # BLD AUTO: 190 10E9/L (ref 150–450)
RBC # BLD AUTO: 4.37 10E12/L (ref 4.4–5.9)
WBC # BLD AUTO: 4.4 10E9/L (ref 4–11)

## 2018-02-01 PROCEDURE — 25000128 H RX IP 250 OP 636: Performed by: RADIOLOGY

## 2018-02-01 PROCEDURE — 27210886 ZZH ACCESSORY CR5

## 2018-02-01 PROCEDURE — 76937 US GUIDE VASCULAR ACCESS: CPT

## 2018-02-01 PROCEDURE — C1788 PORT, INDWELLING, IMP: HCPCS

## 2018-02-01 PROCEDURE — C1769 GUIDE WIRE: HCPCS

## 2018-02-01 PROCEDURE — 85610 PROTHROMBIN TIME: CPT | Performed by: RADIOLOGY

## 2018-02-01 PROCEDURE — 25000128 H RX IP 250 OP 636: Performed by: NURSE PRACTITIONER

## 2018-02-01 PROCEDURE — 36415 COLL VENOUS BLD VENIPUNCTURE: CPT | Performed by: RADIOLOGY

## 2018-02-01 PROCEDURE — 27211193 ZZ H WOUND GLUE CR1

## 2018-02-01 PROCEDURE — 85027 COMPLETE CBC AUTOMATED: CPT | Performed by: RADIOLOGY

## 2018-02-01 PROCEDURE — 27210905 ZZH KIT CR7

## 2018-02-01 PROCEDURE — 85730 THROMBOPLASTIN TIME PARTIAL: CPT | Performed by: RADIOLOGY

## 2018-02-01 PROCEDURE — 40000854 ZZH STATISTIC SIMPLE TUBE INSERTION/CHARGE, PORT, CATH, FISTULOGRAM

## 2018-02-01 RX ORDER — LIDOCAINE HYDROCHLORIDE 10 MG/ML
1-30 INJECTION, SOLUTION EPIDURAL; INFILTRATION; INTRACAUDAL; PERINEURAL
Status: DISCONTINUED | OUTPATIENT
Start: 2018-02-01 | End: 2018-02-01 | Stop reason: HOSPADM

## 2018-02-01 RX ORDER — FENTANYL CITRATE 50 UG/ML
25-50 INJECTION, SOLUTION INTRAMUSCULAR; INTRAVENOUS EVERY 5 MIN PRN
Status: DISCONTINUED | OUTPATIENT
Start: 2018-02-01 | End: 2018-02-01 | Stop reason: HOSPADM

## 2018-02-01 RX ORDER — HEPARIN SODIUM 1000 [USP'U]/ML
INJECTION, SOLUTION INTRAVENOUS; SUBCUTANEOUS
Status: DISCONTINUED
Start: 2018-02-01 | End: 2018-02-01 | Stop reason: HOSPADM

## 2018-02-01 RX ORDER — HEPARIN SODIUM,PORCINE 10 UNIT/ML
5 VIAL (ML) INTRAVENOUS EVERY 24 HOURS
Status: DISCONTINUED | OUTPATIENT
Start: 2018-02-01 | End: 2018-02-01 | Stop reason: HOSPADM

## 2018-02-01 RX ORDER — HEPARIN SODIUM (PORCINE) LOCK FLUSH IV SOLN 100 UNIT/ML 100 UNIT/ML
SOLUTION INTRAVENOUS
Status: DISCONTINUED
Start: 2018-02-01 | End: 2018-02-01 | Stop reason: HOSPADM

## 2018-02-01 RX ORDER — NALOXONE HYDROCHLORIDE 0.4 MG/ML
.1-.4 INJECTION, SOLUTION INTRAMUSCULAR; INTRAVENOUS; SUBCUTANEOUS
Status: DISCONTINUED | OUTPATIENT
Start: 2018-02-01 | End: 2018-02-01 | Stop reason: HOSPADM

## 2018-02-01 RX ORDER — HEPARIN SODIUM (PORCINE) LOCK FLUSH IV SOLN 100 UNIT/ML 100 UNIT/ML
500 SOLUTION INTRAVENOUS ONCE
Status: COMPLETED | OUTPATIENT
Start: 2018-02-01 | End: 2018-02-01

## 2018-02-01 RX ORDER — LIDOCAINE HYDROCHLORIDE 10 MG/ML
INJECTION, SOLUTION INFILTRATION; PERINEURAL
Status: DISCONTINUED
Start: 2018-02-01 | End: 2018-02-01 | Stop reason: HOSPADM

## 2018-02-01 RX ORDER — HEPARIN SODIUM (PORCINE) LOCK FLUSH IV SOLN 100 UNIT/ML 100 UNIT/ML
5 SOLUTION INTRAVENOUS
Status: DISCONTINUED | OUTPATIENT
Start: 2018-02-01 | End: 2018-02-01 | Stop reason: HOSPADM

## 2018-02-01 RX ORDER — FLUMAZENIL 0.1 MG/ML
0.2 INJECTION, SOLUTION INTRAVENOUS
Status: DISCONTINUED | OUTPATIENT
Start: 2018-02-01 | End: 2018-02-01 | Stop reason: HOSPADM

## 2018-02-01 RX ORDER — FENTANYL CITRATE 50 UG/ML
INJECTION, SOLUTION INTRAMUSCULAR; INTRAVENOUS
Status: DISCONTINUED
Start: 2018-02-01 | End: 2018-02-01 | Stop reason: HOSPADM

## 2018-02-01 RX ORDER — LIDOCAINE 40 MG/G
CREAM TOPICAL
Status: DISCONTINUED | OUTPATIENT
Start: 2018-02-01 | End: 2018-02-01 | Stop reason: HOSPADM

## 2018-02-01 RX ADMIN — FENTANYL CITRATE 50 MCG: 50 INJECTION, SOLUTION INTRAMUSCULAR; INTRAVENOUS at 14:30

## 2018-02-01 RX ADMIN — MIDAZOLAM HYDROCHLORIDE 0.5 MG: 1 INJECTION, SOLUTION INTRAMUSCULAR; INTRAVENOUS at 15:03

## 2018-02-01 RX ADMIN — MIDAZOLAM HYDROCHLORIDE 1 MG: 1 INJECTION, SOLUTION INTRAMUSCULAR; INTRAVENOUS at 14:47

## 2018-02-01 RX ADMIN — HEPARIN SODIUM (PORCINE) LOCK FLUSH IV SOLN 100 UNIT/ML 500 UNITS: 100 SOLUTION at 15:01

## 2018-02-01 RX ADMIN — MIDAZOLAM HYDROCHLORIDE 0.5 MG: 1 INJECTION, SOLUTION INTRAMUSCULAR; INTRAVENOUS at 14:54

## 2018-02-01 RX ADMIN — FENTANYL CITRATE 50 MCG: 50 INJECTION, SOLUTION INTRAMUSCULAR; INTRAVENOUS at 14:48

## 2018-02-01 RX ADMIN — MIDAZOLAM HYDROCHLORIDE 1 MG: 1 INJECTION, SOLUTION INTRAMUSCULAR; INTRAVENOUS at 14:30

## 2018-02-01 RX ADMIN — CEFAZOLIN SODIUM 2 G: 2 SOLUTION INTRAVENOUS at 14:10

## 2018-02-01 NOTE — PROGRESS NOTES
D/c instr given to pt and family.  Both state understanding.  Pt has copy and power port booklet info.  Site remains WDL, no pain.  D/c to home via WC.

## 2018-02-01 NOTE — DISCHARGE INSTRUCTIONS
Port Insertion Discharge Instructions     After you go home:      Have an adult stay with you for the first 6 hours    You may resume your normal diet       For 24 hours - due to the sedation you received:    Relax and take it easy    Do NOT make any important or legal decisions    Do NOT drive or operate machines at home or at work    Do NOT drink alcohol    Care of Incision sites:      You have a liquid adhesive covering on your incisions. Do not remove the adhesive residue. It will come off on it's own.    You may shower tomorrow.    You may cover the wound with a bandaid if needed for comfort.      Activity       Avoid heavy lifting (greater than 10 pounds) or the overuse of your shoulder for 3 days    Bleeding:        --resume Lovenox tomorrow as directed    If you start bleeding from the incision sites in your chest or neck - or have swelling in your neck, sit down and press on the site for 5-10 minutes.     If bleeding has not stopped after 10 minutes, call your provider.        Call 911 right away if you have heavy bleeding or bleeding that does not stop.      Medicines:      You may resume all medications    Resume your  Aspirin tomorrow at your regular dose    For minor pain, you may take Acetaminophen (Tylenol) or Ibuprofen (Advil)    Call the provider who ordered this procedure if:      You have swelling in your neck or over your port site    The incision area is red, swollen, hot or tender    You have chills or a fever greater than 101 F (38 C)    Any questions or concerns    Call  911 or go to the Emergency Room if you have:      Severe chest pain or trouble breathing    Bleeding that you cannot control    Additional Information:      Your port may be accessed right away.     You will need to have your port flushed every 30 days or after each use.      If you have questions call:          [facility] Radiology Dept @ [phone]

## 2018-02-01 NOTE — PROGRESS NOTES
Pre procedure plan of care reviewed with pt and wife prior to sedation.  All questions answered and pt appears to accept and understand.

## 2018-02-01 NOTE — IP AVS SNAPSHOT
[place] [address]    [place] [address]    Phone:  [phone]                                       After Visit Summary   2/1/2018    Edilberto Zhao    MRN: [id number]           After Visit Summary Signature Page     I have received my discharge instructions, and my questions have been answered. I have discussed any challenges I see with this plan with the nurse or doctor.    ..........................................................................................................................................  Patient/Patient Representative Signature      ..........................................................................................................................................  Patient Representative Print Name and Relationship to Patient    ..................................................               ................................................  Date                                            Time    ..........................................................................................................................................  Reviewed by Signature/Title    ...................................................              ..............................................  Date                                                            Time

## 2018-02-01 NOTE — PROGRESS NOTES
Interventional Radiology Pre-Procedure Sedation Assessment   Time of Assessment: 12:51 PM    Expected Level: Moderate Sedation    Indication: Sedation is required for the following type of Procedure: Port a catheter placement    Sedation and procedural consent: Risks, benefits and alternatives were discussed with Patient and Spouse, Dr Neptali COLON Intake: Appropriately NPO for procedure    ASA Class: Class 2 - MILD SYSTEMIC DISEASE, NO ACUTE PROBLEMS, NO FUNCTIONAL LIMITATIONS.    Mallampati: Grade 1:  Soft palate, uvula, tonsillar pillars, and posterior pharyngeal wall visible    Lungs: Lungs Clear with good breath sounds bilaterally    Heart: Normal heart sounds and rate    History and physical reviewed and no updates needed. I have reviewed the lab findings, diagnostic data, medications, and the plan for sedation. I have determined this patient to be an appropriate candidate for the planned sedation and procedure and have reassessed the patient IMMEDIATELY PRIOR to sedation and procedure.    Sheila Roberts, HENNY CNP

## 2018-02-01 NOTE — PROGRESS NOTES
Interventional Radiology Intra-procedural Nursing Note    Patient Name: Edilberto Zhao  Medical Record Number: [id number]  Today's Date: February 1, 2018    Start Time: 1447  End of procedure time: 1507  Procedure: Port a catheter placement  Report given to: Care Suites  Time pt departs:  1515  1420 - pt to IR 3 via cart.  Pt prepped with chloraprep and draped in a sterile fashion. 1447 - Dr Lucero into begin procedure. 1507 - procedure finish time.  Pt tolerated procedure well.     Other Notes:     Cecile Floyd

## 2018-02-01 NOTE — IP AVS SNAPSHOT
MRN:[id number]                      After Visit Summary   2/1/2018    Edilberto Zhao    MRN: [id number]           Visit Information        Department      2/1/2018 10:35 AM [facility]          Review of your medicines      UNREVIEWED medicines. Ask your doctor about these medicines        Dose / Directions    ALEVE PO        Dose:  2 tablet   Take 2 tablets by mouth daily as needed for moderate pain   Refills:  0       ASPIRIN PO        Dose:  81 mg   Take 81 mg by mouth daily   Refills:  0       ATORVASTATIN CALCIUM PO        Dose:  40 mg   Take 40 mg by mouth At Bedtime   Refills:  0       BASAGLAR KWIKPEN SC        Dose:  15 Units   Inject 15 Units Subcutaneous every evening   Refills:  0       enoxaparin 40 MG/0.4ML injection   Commonly known as:  LOVENOX   Used for:  Malignant neoplasm of sigmoid (flexure) (H)        Dose:  40 mg   Inject 0.4 mLs (40 mg) Subcutaneous every 24 hours   Quantity:  28 Syringe   Refills:  0       fluticasone 50 MCG/ACT spray   Commonly known as:  FLONASE        Dose:  1-2 spray   Spray 1-2 sprays into both nostrils daily as needed for rhinitis or allergies   Refills:  0       lisinopril-hydrochlorothiazide 20-25 MG per tablet   Commonly known as:  PRINZIDE/ZESTORETIC        Dose:  1 tablet   Take 1 tablet by mouth every evening   Refills:  0       METFORMIN HCL PO        Dose:  1000 mg   Take 1,000 mg by mouth 2 times daily   Refills:  0       multivitamin, therapeutic with minerals Tabs tablet        Dose:  1 tablet   Take 1 tablet by mouth daily   Refills:  0       OMEGA 3 PO        Dose:  2 tablet   Take 2 tablets by mouth 2 times daily   Refills:  0       oxyCODONE IR 5 MG tablet   Commonly known as:  ROXICODONE   Used for:  S/P partial resection of colon        Dose:  5 mg   Take 1 tablet (5 mg) by mouth every 4 hours as needed for moderate to severe pain   Quantity:  30 tablet   Refills:  0       VIAGRA PO        Dose:  100 mg   Take 100  mg by mouth daily as needed   Refills:  0       ZYRTEC PO        Dose:  10 mg   Take 10 mg by mouth daily as needed   Refills:  0                Protect others around you: Learn how to safely use, store and throw away your medicines at www.disposemymeds.org.         Follow-ups after your visit         Care Instructions        Further instructions from your care team       Port Insertion Discharge Instructions     After you go home:      Have an adult stay with you for the first 6 hours    You may resume your normal diet       For 24 hours - due to the sedation you received:    Relax and take it easy    Do NOT make any important or legal decisions    Do NOT drive or operate machines at home or at work    Do NOT drink alcohol    Care of Incision sites:      You have a liquid adhesive covering on your incisions. Do not remove the adhesive residue. It will come off on it's own.    You may shower tomorrow.    You may cover the wound with a bandaid if needed for comfort.      Activity       Avoid heavy lifting (greater than 10 pounds) or the overuse of your shoulder for 3 days    Bleeding:        --resume Lovenox tomorrow as directed    If you start bleeding from the incision sites in your chest or neck - or have swelling in your neck, sit down and press on the site for 5-10 minutes.     If bleeding has not stopped after 10 minutes, call your provider.        Call 911 right away if you have heavy bleeding or bleeding that does not stop.      Medicines:      You may resume all medications    Resume your  Aspirin tomorrow at your regular dose    For minor pain, you may take Acetaminophen (Tylenol) or Ibuprofen (Advil)    Call the provider who ordered this procedure if:      You have swelling in your neck or over your port site    The incision area is red, swollen, hot or tender    You have chills or a fever greater than 101 F (38 C)    Any questions or concerns    Call  911 or go to the Emergency Room if you  "have:      Severe chest pain or trouble breathing    Bleeding that you cannot control    Additional Information:      Your port may be accessed right away.     You will need to have your port flushed every 30 days or after each use.      If you have questions call:          [facility] Radiology Dept @ [phone]             Additional Information About Your Visit        MyChart Information     ModiFacehart lets you send messages to your doctor, view your test results, renew your prescriptions, schedule appointments and more. To sign up, go to [email or web address] . Click on \"Log in\" on the left side of the screen, which will take you to the Welcome page. Then click on \"Sign up Now\" on the right side of the page.     You will be asked to enter the access code listed below, as well as some personal information. Please follow the directions to create your username and password.     Your access code is: [email or web address]  Expires: 2018 12:00 PM     Your access code will  in 90 days. If you need help or a new code, please call your [facility] clinic or [phone].        Care EveryWhere ID     This is your Care EveryWhere ID. This could be used by other organizations to access your [facility] medical records  [id number]        Your Vitals Were     Blood Pressure Pulse Temperature Respirations Height Weight    138/88 (BP Location: Right arm) 68 98.1  F (36.7  C) (Oral) 18 1.753 m (5' 9\") 114.3 kg (252 lb)    Pulse Oximetry BMI (Body Mass Index)                97% 37.21 kg/m2           Primary Care Provider Office Phone # Fax #    Carlie Costa [phone]      Equal Access to Services     [facility]: Hadii debbi Palacios, waaxda lumaria l, qaybta kaalmada mikala, hollie mann . So [facility] [phone].    ATENCIÓN: Si habla español, tiene a fernandes disposición servicios gratuitos de asistencia lingüística. Llame al [phone].    We comply with " applicable federal civil rights laws and [place] laws. We do not discriminate on the basis of race, color, national origin, age, disability, sex, sexual orientation, or gender identity.            Thank you!     Thank you for choosing [facility] for your care. Our goal is always to provide you with excellent care. Hearing back from our patients is one way we can continue to improve our services. Please take a few minutes to complete the written survey that you may receive in the mail after you visit with us. Thank you!             Medication List: This is a list of all your medications and when to take them. Check marks below indicate your daily home schedule. Keep this list as a reference.      Medications           Morning Afternoon Evening Bedtime As Needed    ALEVE PO   Take 2 tablets by mouth daily as needed for moderate pain                                ASPIRIN PO   Take 81 mg by mouth daily                                ATORVASTATIN CALCIUM PO   Take 40 mg by mouth At Bedtime                                BASAGLAR KWIKPEN SC   Inject 15 Units Subcutaneous every evening                                enoxaparin 40 MG/0.4ML injection   Commonly known as:  LOVENOX   Inject 0.4 mLs (40 mg) Subcutaneous every 24 hours                                fluticasone 50 MCG/ACT spray   Commonly known as:  FLONASE   Spray 1-2 sprays into both nostrils daily as needed for rhinitis or allergies                                lisinopril-hydrochlorothiazide 20-25 MG per tablet   Commonly known as:  PRINZIDE/ZESTORETIC   Take 1 tablet by mouth every evening                                METFORMIN HCL PO   Take 1,000 mg by mouth 2 times daily                                multivitamin, therapeutic with minerals Tabs tablet   Take 1 tablet by mouth daily                                OMEGA 3 PO   Take 2 tablets by mouth 2 times daily                                oxyCODONE IR 5 MG tablet   Commonly known as:  ROXICODONE    Take 1 tablet (5 mg) by mouth every 4 hours as needed for moderate to severe pain                                VIAGRA PO   Take 100 mg by mouth daily as needed                                ZYRTEC PO   Take 10 mg by mouth daily as needed

## 2018-02-01 NOTE — PROGRESS NOTES
Return to CS 21 at 1525, alert, denies pain.  VSS.  Steri strip dressing to right upper chest with small IJ nick in skin at neck.  All CDI.  Steri strip closing only, no other dressing.  No drainage.  Will cont to monitor.  Pt has Power port booklet and information, advised to keep ID card in wallet for futere ID at [facility].

## 2018-04-04 ENCOUNTER — TRANSFERRED RECORDS (OUTPATIENT)
Dept: HEALTH INFORMATION MANAGEMENT | Facility: CLINIC | Age: 53
End: 2018-04-04

## 2018-05-04 ENCOUNTER — APPOINTMENT (OUTPATIENT)
Dept: INTERVENTIONAL RADIOLOGY/VASCULAR | Facility: CLINIC | Age: 53
End: 2018-05-04
Attending: INTERNAL MEDICINE
Payer: COMMERCIAL

## 2018-05-04 ENCOUNTER — APPOINTMENT (OUTPATIENT)
Dept: ULTRASOUND IMAGING | Facility: CLINIC | Age: 53
End: 2018-05-04
Attending: NURSE PRACTITIONER
Payer: COMMERCIAL

## 2018-05-04 ENCOUNTER — HOSPITAL ENCOUNTER (OUTPATIENT)
Facility: CLINIC | Age: 53
Discharge: HOME | End: 2018-05-04
Attending: RADIOLOGY | Admitting: RADIOLOGY
Payer: COMMERCIAL

## 2018-05-04 ENCOUNTER — APPOINTMENT (OUTPATIENT)
Dept: GENERAL RADIOLOGY | Facility: CLINIC | Age: 53
End: 2018-05-04
Attending: NURSE PRACTITIONER
Payer: COMMERCIAL

## 2018-05-04 VITALS
RESPIRATION RATE: 16 BRPM | HEIGHT: 70 IN | DIASTOLIC BLOOD PRESSURE: 67 MMHG | BODY MASS INDEX: 36.51 KG/M2 | SYSTOLIC BLOOD PRESSURE: 108 MMHG | HEART RATE: 98 BPM | TEMPERATURE: 99.1 F | OXYGEN SATURATION: 96 % | WEIGHT: 255 LBS

## 2018-05-04 DIAGNOSIS — Z95.828 PORT CATHETER IN PLACE: ICD-10-CM

## 2018-05-04 LAB
APTT PPP: 29 SEC (ref 22–37)
ERYTHROCYTE [DISTWIDTH] IN BLOOD BY AUTOMATED COUNT: 17.3 % (ref 10–15)
HCT VFR BLD AUTO: 38.4 % (ref 40–53)
HGB BLD-MCNC: 13.2 G/DL (ref 13.3–17.7)
INR PPP: 0.95 (ref 0.86–1.14)
MCH RBC QN AUTO: 31.4 PG (ref 26.5–33)
MCHC RBC AUTO-ENTMCNC: 34.4 G/DL (ref 31.5–36.5)
MCV RBC AUTO: 91 FL (ref 78–100)
PLATELET # BLD AUTO: 115 10E9/L (ref 150–450)
RBC # BLD AUTO: 4.21 10E12/L (ref 4.4–5.9)
WBC # BLD AUTO: 3.3 10E9/L (ref 4–11)

## 2018-05-04 PROCEDURE — 25000125 ZZHC RX 250

## 2018-05-04 PROCEDURE — 76000 FLUOROSCOPY <1 HR PHYS/QHP: CPT

## 2018-05-04 PROCEDURE — C1769 GUIDE WIRE: HCPCS

## 2018-05-04 PROCEDURE — 25000128 H RX IP 250 OP 636: Performed by: RADIOLOGY

## 2018-05-04 PROCEDURE — 36005 INJECTION EXT VENOGRAPHY: CPT | Mod: XS

## 2018-05-04 PROCEDURE — 85027 COMPLETE CBC AUTOMATED: CPT | Performed by: RADIOLOGY

## 2018-05-04 PROCEDURE — 40000854 ZZH STATISTIC SIMPLE TUBE INSERTION/CHARGE, PORT, CATH, FISTULOGRAM

## 2018-05-04 PROCEDURE — 71045 X-RAY EXAM CHEST 1 VIEW: CPT | Mod: XU

## 2018-05-04 PROCEDURE — 27210893 ZZH CATH CR5

## 2018-05-04 PROCEDURE — 27210914 ZZH SHEATH CR8

## 2018-05-04 PROCEDURE — 27210886 ZZH ACCESSORY CR5

## 2018-05-04 PROCEDURE — 36597 REPOSITION VENOUS CATHETER: CPT

## 2018-05-04 PROCEDURE — 85610 PROTHROMBIN TIME: CPT | Performed by: RADIOLOGY

## 2018-05-04 PROCEDURE — 93970 EXTREMITY STUDY: CPT

## 2018-05-04 PROCEDURE — 27210888 ZZH ACCESSORY CR7

## 2018-05-04 PROCEDURE — 36415 COLL VENOUS BLD VENIPUNCTURE: CPT | Performed by: RADIOLOGY

## 2018-05-04 PROCEDURE — 85730 THROMBOPLASTIN TIME PARTIAL: CPT | Performed by: RADIOLOGY

## 2018-05-04 PROCEDURE — 75820 VEIN X-RAY ARM/LEG: CPT | Mod: XS

## 2018-05-04 RX ORDER — CEFAZOLIN SODIUM 1 G/3ML
INJECTION, POWDER, FOR SOLUTION INTRAMUSCULAR; INTRAVENOUS
Status: DISCONTINUED
Start: 2018-05-04 | End: 2018-05-04 | Stop reason: WASHOUT

## 2018-05-04 RX ORDER — CEFAZOLIN SODIUM 2 G/100ML
2 INJECTION, SOLUTION INTRAVENOUS
Status: DISCONTINUED | OUTPATIENT
Start: 2018-05-04 | End: 2018-05-04 | Stop reason: HOSPADM

## 2018-05-04 RX ORDER — HEPARIN SODIUM (PORCINE) LOCK FLUSH IV SOLN 100 UNIT/ML 100 UNIT/ML
500 SOLUTION INTRAVENOUS ONCE
Status: COMPLETED | OUTPATIENT
Start: 2018-05-04 | End: 2018-05-04

## 2018-05-04 RX ORDER — NICOTINE POLACRILEX 4 MG
15-30 LOZENGE BUCCAL
Status: DISCONTINUED | OUTPATIENT
Start: 2018-05-04 | End: 2018-05-04 | Stop reason: HOSPADM

## 2018-05-04 RX ORDER — LIDOCAINE 40 MG/G
CREAM TOPICAL
Status: DISCONTINUED | OUTPATIENT
Start: 2018-05-04 | End: 2018-05-04 | Stop reason: HOSPADM

## 2018-05-04 RX ORDER — HEPARIN SODIUM (PORCINE) LOCK FLUSH IV SOLN 100 UNIT/ML 100 UNIT/ML
5 SOLUTION INTRAVENOUS
Status: DISCONTINUED | OUTPATIENT
Start: 2018-05-04 | End: 2018-05-04 | Stop reason: HOSPADM

## 2018-05-04 RX ORDER — LIDOCAINE HYDROCHLORIDE 10 MG/ML
1-30 INJECTION, SOLUTION EPIDURAL; INFILTRATION; INTRACAUDAL; PERINEURAL
Status: COMPLETED | OUTPATIENT
Start: 2018-05-04 | End: 2018-05-04

## 2018-05-04 RX ORDER — FLUMAZENIL 0.1 MG/ML
0.2 INJECTION, SOLUTION INTRAVENOUS
Status: DISCONTINUED | OUTPATIENT
Start: 2018-05-04 | End: 2018-05-04 | Stop reason: HOSPADM

## 2018-05-04 RX ORDER — NALOXONE HYDROCHLORIDE 0.4 MG/ML
.1-.4 INJECTION, SOLUTION INTRAMUSCULAR; INTRAVENOUS; SUBCUTANEOUS
Status: DISCONTINUED | OUTPATIENT
Start: 2018-05-04 | End: 2018-05-04 | Stop reason: HOSPADM

## 2018-05-04 RX ORDER — FENTANYL CITRATE 50 UG/ML
25-50 INJECTION, SOLUTION INTRAMUSCULAR; INTRAVENOUS EVERY 5 MIN PRN
Status: DISCONTINUED | OUTPATIENT
Start: 2018-05-04 | End: 2018-05-04 | Stop reason: HOSPADM

## 2018-05-04 RX ORDER — IOPAMIDOL 612 MG/ML
50 INJECTION, SOLUTION INTRAVASCULAR ONCE
Status: COMPLETED | OUTPATIENT
Start: 2018-05-04 | End: 2018-05-04

## 2018-05-04 RX ORDER — HEPARIN SODIUM,PORCINE 10 UNIT/ML
5 VIAL (ML) INTRAVENOUS EVERY 24 HOURS
Status: DISCONTINUED | OUTPATIENT
Start: 2018-05-04 | End: 2018-05-04 | Stop reason: HOSPADM

## 2018-05-04 RX ORDER — DEXTROSE MONOHYDRATE 25 G/50ML
25-50 INJECTION, SOLUTION INTRAVENOUS
Status: DISCONTINUED | OUTPATIENT
Start: 2018-05-04 | End: 2018-05-04 | Stop reason: HOSPADM

## 2018-05-04 RX ORDER — HEPARIN SODIUM (PORCINE) LOCK FLUSH IV SOLN 100 UNIT/ML 100 UNIT/ML
SOLUTION INTRAVENOUS
Status: DISCONTINUED
Start: 2018-05-04 | End: 2018-05-04 | Stop reason: HOSPADM

## 2018-05-04 RX ORDER — FENTANYL CITRATE 50 UG/ML
INJECTION, SOLUTION INTRAMUSCULAR; INTRAVENOUS
Status: DISCONTINUED
Start: 2018-05-04 | End: 2018-05-04 | Stop reason: HOSPADM

## 2018-05-04 RX ORDER — LIDOCAINE HYDROCHLORIDE 10 MG/ML
INJECTION, SOLUTION INFILTRATION; PERINEURAL
Status: COMPLETED
Start: 2018-05-04 | End: 2018-05-04

## 2018-05-04 RX ADMIN — FENTANYL CITRATE 50 MCG: 50 INJECTION, SOLUTION INTRAMUSCULAR; INTRAVENOUS at 12:42

## 2018-05-04 RX ADMIN — MIDAZOLAM HYDROCHLORIDE 1 MG: 1 INJECTION, SOLUTION INTRAMUSCULAR; INTRAVENOUS at 12:41

## 2018-05-04 RX ADMIN — HEPARIN SODIUM 10000 UNITS: 10000 INJECTION, SOLUTION INTRAVENOUS; SUBCUTANEOUS at 13:30

## 2018-05-04 RX ADMIN — HEPARIN SODIUM 10000 UNITS: 10000 INJECTION, SOLUTION INTRAVENOUS; SUBCUTANEOUS at 12:36

## 2018-05-04 RX ADMIN — LIDOCAINE HYDROCHLORIDE 20 ML: 10 INJECTION, SOLUTION EPIDURAL; INFILTRATION; INTRACAUDAL; PERINEURAL at 13:56

## 2018-05-04 RX ADMIN — HEPARIN 500 UNITS: 100 SYRINGE at 13:46

## 2018-05-04 RX ADMIN — IOPAMIDOL 45 ML: 612 INJECTION, SOLUTION INTRAVENOUS at 13:56

## 2018-05-04 RX ADMIN — LIDOCAINE HYDROCHLORIDE 20 ML: 10 INJECTION, SOLUTION INFILTRATION; PERINEURAL at 13:56

## 2018-05-04 RX ADMIN — HEPARIN SODIUM 10000 UNITS: 10000 INJECTION, SOLUTION INTRAVENOUS; SUBCUTANEOUS at 13:28

## 2018-05-04 RX ADMIN — FENTANYL CITRATE 50 MCG: 50 INJECTION, SOLUTION INTRAMUSCULAR; INTRAVENOUS at 13:02

## 2018-05-04 RX ADMIN — MIDAZOLAM HYDROCHLORIDE 1 MG: 1 INJECTION, SOLUTION INTRAMUSCULAR; INTRAVENOUS at 13:02

## 2018-05-04 NOTE — IR NOTE
Interventional Radiology Intra-procedural Nursing Note    Patient Name: Edilberto Zhao  Medical Record Number: [id number]  Today's Date: May 4, 2018    Start Time: 1241  End of procedure time: 1349  Procedure: port revision/reposition with venogram and hep lock.  Report given to: SAMANTHA Aly in care suites  Time pt departs:  1405      Other Notes: VSS. No c/o pain at this time. Pt remains on RA. Right groin venous stick is CDI, soft dressed with a 2x2 quick clot and tegaderm. Right upper arm CDI, soft with a 2x2 quick clot and tegaderm.     WILFRED NEWTON

## 2018-05-04 NOTE — PROCEDURES
RADIOLOGY PROCEDURE NOTE  Patient name: Edilberto Zhao  MRN: [id number]  : 1965    Pre-procedure diagnosis: Displaced Port catheter  Post-procedure diagnosis: Same    Procedure Date/Time: May 4, 2018  2:01 PM  Procedure: RUE venous access and venogram, demonstrating DVT throughout RUE.  Right groin (CF VEIN) accessed, and eventually able to snare the catheter and reposition port catheter into normal position.  Port accessed and easily able to aspirate blood.  Heparinized.  Ready for use.  Patient will need to be anticoagulated.  Estimated blood loss: 5 ml  Specimen(s) collected with description: Please see above.  The patient tolerated the procedure well with no immediate complications.  Significant findings:  Please see above.    See imaging dictation for procedural details.    Provider name: Gage Berry  Assistant(s):None

## 2018-05-04 NOTE — PROGRESS NOTES
Pt s/p port-a-cath revision.  Was on bedrest for two hours.  Right groin site CDI, no hematoma or oozing.  OOB steady on feet with no change in puncture site.  Tolerating diet.  VSS.  Discharge instructions reviewed with daughter and patient.  Ultra sound completed and patient notified of results.  Dr. Peck called blood thinner in to [facility].  Patient will  on way home and start tonight.  Discharged per wheelchair per private vehicle.

## 2018-05-04 NOTE — PROGRESS NOTES
Care Suites Arrival    Reason for Visit: Port-a-cath Revision    A/O. Denies pain. D/C instructions reviewed. Copy given to patient.  All questions & concerns addressed.     Pt resting on cart, denies additional needs at this time, call light within reach. Daughter at bedside.  Will cont to monitor.

## 2018-05-04 NOTE — IP AVS SNAPSHOT
[place] [address]    [place] [address]    Phone:  [phone]                                       After Visit Summary   5/4/2018    Edilberto Zhao    MRN: [id number]           After Visit Summary Signature Page     I have received my discharge instructions, and my questions have been answered. I have discussed any challenges I see with this plan with the nurse or doctor.    ..........................................................................................................................................  Patient/Patient Representative Signature      ..........................................................................................................................................  Patient Representative Print Name and Relationship to Patient    ..................................................               ................................................  Date                                            Time    ..........................................................................................................................................  Reviewed by Signature/Title    ...................................................              ..............................................  Date                                                            Time

## 2018-05-04 NOTE — IP AVS SNAPSHOT
MRN:[id number]                      After Visit Summary   5/4/2018    Edilberto Zhao    MRN: [id number]           Visit Information        Department      5/4/2018 10:20 AM [facility]          Review of your medicines      UNREVIEWED medicines. Ask your doctor about these medicines        Dose / Directions    ALEVE PO        Dose:  2 tablet   Take 2 tablets by mouth daily as needed for moderate pain   Refills:  0       ASPIRIN PO        Dose:  81 mg   Take 81 mg by mouth daily   Refills:  0       ATORVASTATIN CALCIUM PO        Dose:  40 mg   Take 40 mg by mouth At Bedtime   Refills:  0       BASAGLAR KWIKPEN SC        Dose:  15 Units   Inject 15 Units Subcutaneous every evening   Refills:  0       fluticasone 50 MCG/ACT spray   Commonly known as:  FLONASE        Dose:  1-2 spray   Spray 1-2 sprays into both nostrils daily as needed for rhinitis or allergies   Refills:  0       lisinopril-hydrochlorothiazide 20-25 MG per tablet   Commonly known as:  PRINZIDE/ZESTORETIC        Dose:  1 tablet   Take 1 tablet by mouth every evening   Refills:  0       METFORMIN HCL PO        Dose:  1000 mg   Take 1,000 mg by mouth 2 times daily   Refills:  0       multivitamin, therapeutic with minerals Tabs tablet        Dose:  1 tablet   Take 1 tablet by mouth daily   Refills:  0       OMEGA 3 PO        Dose:  2 tablet   Take 2 tablets by mouth 2 times daily   Refills:  0       oxyCODONE IR 5 MG tablet   Commonly known as:  ROXICODONE   Used for:  S/P partial resection of colon        Dose:  5 mg   Take 1 tablet (5 mg) by mouth every 4 hours as needed for moderate to severe pain   Quantity:  30 tablet   Refills:  0       VIAGRA PO        Dose:  100 mg   Take 100 mg by mouth daily as needed   Refills:  0       ZYRTEC PO        Dose:  10 mg   Take 10 mg by mouth daily as needed   Refills:  0                Protect others around you: Learn how to safely use, store and throw away your medicines at  www.disposemymeds.org.         Follow-ups after your visit         Care Instructions        Further instructions from your care team         Port Removal Discharge Instructions     After you go home:      Have an adult stay with you for the first 6 hours    You may resume your normal diet       For 24 hours - due to the sedation you received:    Relax and take it easy    Do NOT make any important or legal decisions    Do NOT drive or operate machines at home or at work    Do NOT drink alcohol    Care of Puncture Site:      Keep the dressings on your site clean & dry for 3 days. Change it only if it gets wet or dirty.    You may shower after the dressing comes off in 3 days    Do not remove the small white strips of tape, if present. Allow them to fall off on their own.     You may cover the wound with a bandaid after the dressing is removed if needed for comfort      Activity       Avoid heavy lifting (greater than 10 pounds) or the overuse of your shoulder for 3 days    Bleeding:      If you start bleeding from the incision site in your chest or have swelling in your neck, sit down and press on the site for 5-10 minutes.     If bleeding has not stopped after 10 minutes, call your provider.        Call 911 right away if you have heavy bleeding or bleeding that does not stop.      Medicines:      You may resume all medications    Resume your Warfarin/Coumadin at your regular dose today. Follow up with your provider to have your INR rechecked    Resume your Platelet Inhibitors and Aspirin tomorrow at your regular dose    For minor pain, you may take Acetaminophen (Tylenol) or Ibuprofen (Advil)          Call the provider who ordered this procedure if:      You have swelling in your neck or over your port site    The incision area is red, swollen, hot or tender    You have chills or a fever greater than 101 F (38 C)    Any questions or concerns    Call  911 or go to the Emergency Room if you have:      Severe chest pain  "or trouble breathing    Bleeding that you cannot control    If you have questions call:          [facility] Radiology Dept @ [phone]    The provider who performed your procedure was _________________.     Additional Information About Your Visit        MyChart Information     [employer]hart lets you send messages to your doctor, view your test results, renew your prescriptions, schedule appointments and more. To sign up, go to [email or web address] . Click on \"Log in\" on the left side of the screen, which will take you to the Welcome page. Then click on \"Sign up Now\" on the right side of the page.     You will be asked to enter the access code listed below, as well as some personal information. Please follow the directions to create your username and password.     Your access code is: [email or web address]  Expires: 2018 11:14 AM     Your access code will  in 90 days. If you need help or a new code, please call your [facility] clinic or [phone].        Care EveryWhere ID     This is your Care EveryWhere ID. This could be used by other organizations to access your [facility] medical records  [id number]        Your Vitals Were     Blood Pressure Pulse Temperature Respirations Height Weight    108/67 98 99.1  F (37.3  C) (Oral) 16 1.778 m (5' 10\") 115.7 kg (255 lb)    Pulse Oximetry BMI (Body Mass Index)                96% 36.59 kg/m2           Primary Care Provider Office Phone #    aCrlie Costa [phone]      Equal Access to Services     : Hadii debbi thomas Soramez, waaxda luqadaha, qaybta kaalmahollie loredo . So [facility] [phone].    ATENCIÓN: Si habla español, tiene a fernandes disposición servicios gratuitos de asistencia lingüística. Llame al [phone].    We comply with applicable federal civil rights laws and [place] laws. We do not discriminate on the basis of race, color, national origin, age, disability, sex, sexual orientation, or " gender identity.            Thank you!     Thank you for choosing [facility] for your care. Our goal is always to provide you with excellent care. Hearing back from our patients is one way we can continue to improve our services. Please take a few minutes to complete the written survey that you may receive in the mail after you visit with us. Thank you!             Medication List: This is a list of all your medications and when to take them. Check marks below indicate your daily home schedule. Keep this list as a reference.      Medications           Morning Afternoon Evening Bedtime As Needed    ALEVE PO   Take 2 tablets by mouth daily as needed for moderate pain                                ASPIRIN PO   Take 81 mg by mouth daily                                ATORVASTATIN CALCIUM PO   Take 40 mg by mouth At Bedtime                                BASAGLAR KWIKPEN SC   Inject 15 Units Subcutaneous every evening                                fluticasone 50 MCG/ACT spray   Commonly known as:  FLONASE   Spray 1-2 sprays into both nostrils daily as needed for rhinitis or allergies                                lisinopril-hydrochlorothiazide 20-25 MG per tablet   Commonly known as:  PRINZIDE/ZESTORETIC   Take 1 tablet by mouth every evening                                METFORMIN HCL PO   Take 1,000 mg by mouth 2 times daily                                multivitamin, therapeutic with minerals Tabs tablet   Take 1 tablet by mouth daily                                OMEGA 3 PO   Take 2 tablets by mouth 2 times daily                                oxyCODONE IR 5 MG tablet   Commonly known as:  ROXICODONE   Take 1 tablet (5 mg) by mouth every 4 hours as needed for moderate to severe pain                                VIAGRA PO   Take 100 mg by mouth daily as needed                                ZYRTEC PO   Take 10 mg by mouth daily as needed

## 2018-05-04 NOTE — PROGRESS NOTES
Interventional Radiology Pre-Procedure Sedation Assessment   Time of Assessment: 12:17 PM    Expected Level: Moderate Sedation    Indication: Sedation is required for the following type of Procedure: Possible port catheter repositioning or port removal and replacement    Sedation and procedural consent: Risks, benefits and alternatives were discussed with Patient and Relative Daughter, Dr River    DARLENE Intake: Appropriately NPO for procedure    ASA Class: Class 2 - MILD SYSTEMIC DISEASE, NO ACUTE PROBLEMS, NO FUNCTIONAL LIMITATIONS.    Mallampati: Grade 1:  Soft palate, uvula, tonsillar pillars, and posterior pharyngeal wall visible    Lungs: Lungs Clear with good breath sounds bilaterally    Heart: Normal heart sounds and rate    History and physical reviewed and no updates needed. I have reviewed the lab findings, diagnostic data, medications, and the plan for sedation. I have determined this patient to be an appropriate candidate for the planned sedation and procedure and have reassessed the patient IMMEDIATELY PRIOR to sedation and procedure.    Sheila Roberts, HENNY CNP

## 2018-05-04 NOTE — DISCHARGE INSTRUCTIONS
Port Removal Discharge Instructions     After you go home:      Have an adult stay with you for the first 6 hours    You may resume your normal diet       For 24 hours - due to the sedation you received:    Relax and take it easy    Do NOT make any important or legal decisions    Do NOT drive or operate machines at home or at work    Do NOT drink alcohol    Care of Puncture Site:      Keep the dressings on your site clean & dry for 3 days. Change it only if it gets wet or dirty.    You may shower after the dressing comes off in 3 days    Do not remove the small white strips of tape, if present. Allow them to fall off on their own.     You may cover the wound with a bandaid after the dressing is removed if needed for comfort      Activity       Avoid heavy lifting (greater than 10 pounds) or the overuse of your shoulder for 3 days    Bleeding:      If you start bleeding from the incision site in your chest or have swelling in your neck, sit down and press on the site for 5-10 minutes.     If bleeding has not stopped after 10 minutes, call your provider.        Call 911 right away if you have heavy bleeding or bleeding that does not stop.      Medicines:      You may resume all medications    Resume your Warfarin/Coumadin at your regular dose today. Follow up with your provider to have your INR rechecked    Resume your Platelet Inhibitors and Aspirin tomorrow at your regular dose    For minor pain, you may take Acetaminophen (Tylenol) or Ibuprofen (Advil)          Call the provider who ordered this procedure if:      You have swelling in your neck or over your port site    The incision area is red, swollen, hot or tender    You have chills or a fever greater than 101 F (38 C)    Any questions or concerns    Call  911 or go to the Emergency Room if you have:      Severe chest pain or trouble breathing    Bleeding that you cannot control    If you have questions call:          [facility] Radiology Dept @  [phone]    The provider who performed your procedure was _________________.

## 2018-05-04 NOTE — PROGRESS NOTES
Edilberto Zhao is a 52 year old male with a history of colon cancer diagnosed in 12/2017 s/p resection and ongoing chemotherapy. He had a port a catheter  placed on the right in early February which worked fine initially but the last 3 times it was accessed the port has required TPA to get a blood return. He had a CT scan done and the tubing was noted coiled in the subclavian. He is here today to have the catheter repositioned or the port replaced.     The venogram was done by Dr River and he was able to snare the catheter tip from an inferior approach and pull the tip into proper placement. Clot was noted by US when he initially attempted to access the basilic vein which he felt was extensive and possible collateralized.     Dr Peck was called post procedure for anticoagulation recommendations. The patient is now scheduled for a bilateral UE duplex US to evaluate for extent of blood clots which will be done prior to discharge. Dr Peck will check to see how Xarelto is covered by the patient's heal insurance or he may use Lovenox and coumadin. Per the daughter Edilberto has used Lovenox shots in the past after his colon resection so is familiar with it. Dr Peck has the patient's cell phone number and will call him with instructions.     Above was communicated with [facility] also.     Thanks [facility] Interventional Radiology CNP ([phone])

## 2018-09-13 ENCOUNTER — TRANSFERRED RECORDS (OUTPATIENT)
Dept: HEALTH INFORMATION MANAGEMENT | Facility: CLINIC | Age: 53
End: 2018-09-13

## 2018-09-14 ENCOUNTER — ANESTHESIA (OUTPATIENT)
Dept: SURGERY | Facility: CLINIC | Age: 53
End: 2018-09-14
Payer: COMMERCIAL

## 2018-09-14 ENCOUNTER — SURGERY (OUTPATIENT)
Age: 53
End: 2018-09-14

## 2018-09-14 ENCOUNTER — ANESTHESIA EVENT (OUTPATIENT)
Dept: SURGERY | Facility: CLINIC | Age: 53
End: 2018-09-14
Payer: COMMERCIAL

## 2018-09-14 ENCOUNTER — HOSPITAL ENCOUNTER (OUTPATIENT)
Facility: CLINIC | Age: 53
Discharge: HOME | End: 2018-09-14
Attending: THORACIC SURGERY (CARDIOTHORACIC VASCULAR SURGERY) | Admitting: THORACIC SURGERY (CARDIOTHORACIC VASCULAR SURGERY)
Payer: COMMERCIAL

## 2018-09-14 VITALS
DIASTOLIC BLOOD PRESSURE: 80 MMHG | TEMPERATURE: 97.8 F | SYSTOLIC BLOOD PRESSURE: 117 MMHG | WEIGHT: 239.1 LBS | OXYGEN SATURATION: 95 % | BODY MASS INDEX: 35.41 KG/M2 | HEIGHT: 69 IN | RESPIRATION RATE: 16 BRPM

## 2018-09-14 DIAGNOSIS — C18.7 MALIGNANT NEOPLASM OF SIGMOID (FLEXURE) (H): Primary | ICD-10-CM

## 2018-09-14 DIAGNOSIS — G89.18 ACUTE POST-OPERATIVE PAIN: ICD-10-CM

## 2018-09-14 LAB — GLUCOSE BLDC GLUCOMTR-MCNC: 124 MG/DL (ref 70–99)

## 2018-09-14 PROCEDURE — 25000125 ZZHC RX 250: Performed by: THORACIC SURGERY (CARDIOTHORACIC VASCULAR SURGERY)

## 2018-09-14 PROCEDURE — 27210794 ZZH OR GENERAL SUPPLY STERILE: Performed by: THORACIC SURGERY (CARDIOTHORACIC VASCULAR SURGERY)

## 2018-09-14 PROCEDURE — 40000170 ZZH STATISTIC PRE-PROCEDURE ASSESSMENT II: Performed by: THORACIC SURGERY (CARDIOTHORACIC VASCULAR SURGERY)

## 2018-09-14 PROCEDURE — 37000008 ZZH ANESTHESIA TECHNICAL FEE, 1ST 30 MIN: Performed by: THORACIC SURGERY (CARDIOTHORACIC VASCULAR SURGERY)

## 2018-09-14 PROCEDURE — 36000050 ZZH SURGERY LEVEL 2 1ST 30 MIN: Performed by: THORACIC SURGERY (CARDIOTHORACIC VASCULAR SURGERY)

## 2018-09-14 PROCEDURE — 82962 GLUCOSE BLOOD TEST: CPT

## 2018-09-14 PROCEDURE — 71000027 ZZH RECOVERY PHASE 2 EACH 15 MINS: Performed by: THORACIC SURGERY (CARDIOTHORACIC VASCULAR SURGERY)

## 2018-09-14 PROCEDURE — 71000012 ZZH RECOVERY PHASE 1 LEVEL 1 FIRST HR: Performed by: THORACIC SURGERY (CARDIOTHORACIC VASCULAR SURGERY)

## 2018-09-14 PROCEDURE — 27210995 ZZH RX 272: Performed by: THORACIC SURGERY (CARDIOTHORACIC VASCULAR SURGERY)

## 2018-09-14 PROCEDURE — 25000128 H RX IP 250 OP 636: Performed by: NURSE ANESTHETIST, CERTIFIED REGISTERED

## 2018-09-14 PROCEDURE — 25000125 ZZHC RX 250: Performed by: NURSE ANESTHETIST, CERTIFIED REGISTERED

## 2018-09-14 RX ORDER — SODIUM CHLORIDE, SODIUM LACTATE, POTASSIUM CHLORIDE, CALCIUM CHLORIDE 600; 310; 30; 20 MG/100ML; MG/100ML; MG/100ML; MG/100ML
INJECTION, SOLUTION INTRAVENOUS CONTINUOUS
Status: DISCONTINUED | OUTPATIENT
Start: 2018-09-14 | End: 2018-09-14 | Stop reason: HOSPADM

## 2018-09-14 RX ORDER — ONDANSETRON 2 MG/ML
INJECTION INTRAMUSCULAR; INTRAVENOUS PRN
Status: DISCONTINUED | OUTPATIENT
Start: 2018-09-14 | End: 2018-09-14

## 2018-09-14 RX ORDER — FENTANYL CITRATE 50 UG/ML
INJECTION, SOLUTION INTRAMUSCULAR; INTRAVENOUS PRN
Status: DISCONTINUED | OUTPATIENT
Start: 2018-09-14 | End: 2018-09-14

## 2018-09-14 RX ORDER — HYDROMORPHONE HYDROCHLORIDE 1 MG/ML
.3-.5 INJECTION, SOLUTION INTRAMUSCULAR; INTRAVENOUS; SUBCUTANEOUS EVERY 10 MIN PRN
Status: DISCONTINUED | OUTPATIENT
Start: 2018-09-14 | End: 2018-09-14 | Stop reason: HOSPADM

## 2018-09-14 RX ORDER — MAGNESIUM HYDROXIDE 1200 MG/15ML
LIQUID ORAL PRN
Status: DISCONTINUED | OUTPATIENT
Start: 2018-09-14 | End: 2018-09-14 | Stop reason: HOSPADM

## 2018-09-14 RX ORDER — PROPOFOL 10 MG/ML
INJECTION, EMULSION INTRAVENOUS PRN
Status: DISCONTINUED | OUTPATIENT
Start: 2018-09-14 | End: 2018-09-14

## 2018-09-14 RX ORDER — FENTANYL CITRATE 50 UG/ML
25-50 INJECTION, SOLUTION INTRAMUSCULAR; INTRAVENOUS
Status: DISCONTINUED | OUTPATIENT
Start: 2018-09-14 | End: 2018-09-14 | Stop reason: HOSPADM

## 2018-09-14 RX ORDER — MEPERIDINE HYDROCHLORIDE 25 MG/ML
12.5 INJECTION INTRAMUSCULAR; INTRAVENOUS; SUBCUTANEOUS
Status: DISCONTINUED | OUTPATIENT
Start: 2018-09-14 | End: 2018-09-14 | Stop reason: HOSPADM

## 2018-09-14 RX ORDER — SODIUM CHLORIDE, SODIUM LACTATE, POTASSIUM CHLORIDE, CALCIUM CHLORIDE 600; 310; 30; 20 MG/100ML; MG/100ML; MG/100ML; MG/100ML
INJECTION, SOLUTION INTRAVENOUS CONTINUOUS PRN
Status: DISCONTINUED | OUTPATIENT
Start: 2018-09-14 | End: 2018-09-14

## 2018-09-14 RX ORDER — NALOXONE HYDROCHLORIDE 0.4 MG/ML
.1-.4 INJECTION, SOLUTION INTRAMUSCULAR; INTRAVENOUS; SUBCUTANEOUS
Status: DISCONTINUED | OUTPATIENT
Start: 2018-09-14 | End: 2018-09-14 | Stop reason: HOSPADM

## 2018-09-14 RX ORDER — HYDROCODONE BITARTRATE AND ACETAMINOPHEN 5; 325 MG/1; MG/1
1 TABLET ORAL
Status: DISCONTINUED | OUTPATIENT
Start: 2018-09-14 | End: 2018-09-14 | Stop reason: HOSPADM

## 2018-09-14 RX ORDER — ACETAMINOPHEN 325 MG/1
650 TABLET ORAL
Status: DISCONTINUED | OUTPATIENT
Start: 2018-09-14 | End: 2018-09-14 | Stop reason: HOSPADM

## 2018-09-14 RX ORDER — HYDROCODONE BITARTRATE AND ACETAMINOPHEN 5; 325 MG/1; MG/1
1 TABLET ORAL EVERY 6 HOURS PRN
Qty: 4 TABLET | Refills: 0 | Status: SHIPPED | OUTPATIENT
Start: 2018-09-14

## 2018-09-14 RX ORDER — ONDANSETRON 4 MG/1
4 TABLET, ORALLY DISINTEGRATING ORAL EVERY 30 MIN PRN
Status: DISCONTINUED | OUTPATIENT
Start: 2018-09-14 | End: 2018-09-14 | Stop reason: HOSPADM

## 2018-09-14 RX ORDER — PROPOFOL 10 MG/ML
INJECTION, EMULSION INTRAVENOUS CONTINUOUS PRN
Status: DISCONTINUED | OUTPATIENT
Start: 2018-09-14 | End: 2018-09-14

## 2018-09-14 RX ORDER — LIDOCAINE HYDROCHLORIDE 20 MG/ML
INJECTION, SOLUTION INFILTRATION; PERINEURAL PRN
Status: DISCONTINUED | OUTPATIENT
Start: 2018-09-14 | End: 2018-09-14

## 2018-09-14 RX ORDER — ONDANSETRON 2 MG/ML
4 INJECTION INTRAMUSCULAR; INTRAVENOUS EVERY 30 MIN PRN
Status: DISCONTINUED | OUTPATIENT
Start: 2018-09-14 | End: 2018-09-14 | Stop reason: HOSPADM

## 2018-09-14 RX ADMIN — LIDOCAINE HYDROCHLORIDE 40 MG: 20 INJECTION, SOLUTION INFILTRATION; PERINEURAL at 11:29

## 2018-09-14 RX ADMIN — ONDANSETRON 4 MG: 2 INJECTION INTRAMUSCULAR; INTRAVENOUS at 11:29

## 2018-09-14 RX ADMIN — MIDAZOLAM 2 MG: 1 INJECTION INTRAMUSCULAR; INTRAVENOUS at 11:26

## 2018-09-14 RX ADMIN — LIDOCAINE HYDROCHLORIDE 5 ML: 10 INJECTION, SOLUTION EPIDURAL; INFILTRATION; INTRACAUDAL; PERINEURAL at 11:38

## 2018-09-14 RX ADMIN — FENTANYL CITRATE 50 MCG: 50 INJECTION, SOLUTION INTRAMUSCULAR; INTRAVENOUS at 11:29

## 2018-09-14 RX ADMIN — PROPOFOL 100 MCG/KG/MIN: 10 INJECTION, EMULSION INTRAVENOUS at 11:29

## 2018-09-14 RX ADMIN — PROPOFOL 40 MG: 10 INJECTION, EMULSION INTRAVENOUS at 11:29

## 2018-09-14 RX ADMIN — SODIUM CHLORIDE, POTASSIUM CHLORIDE, SODIUM LACTATE AND CALCIUM CHLORIDE: 600; 310; 30; 20 INJECTION, SOLUTION INTRAVENOUS at 11:26

## 2018-09-14 RX ADMIN — SODIUM CHLORIDE 1000 ML: 900 IRRIGANT IRRIGATION at 11:36

## 2018-09-14 RX ADMIN — Medication 1 APPLICATOR: at 11:38

## 2018-09-14 ASSESSMENT — ENCOUNTER SYMPTOMS: DYSRHYTHMIAS: 0

## 2018-09-14 ASSESSMENT — LIFESTYLE VARIABLES: TOBACCO_USE: 0

## 2018-09-14 NOTE — IP AVS SNAPSHOT
MRN:[id number]                      After Visit Summary   9/14/2018    Edilberto Zhao    MRN: [id number]           Thank you!     Thank you for choosing [facility] for your care. Our goal is always to provide you with excellent care. Hearing back from our patients is one way we can continue to improve our services. Please take a few minutes to complete the written survey that you may receive in the mail after you visit with us. Thank you!        Patient Information     Date Of Birth          1965        About your hospital stay     You were admitted on:  September 14, 2018 You last received care in the:  [facility] PACU    You were discharged on:  September 14, 2018       Who to Call     For medical emergencies, please call 911.  For non-urgent questions about your medical care, please call your primary care provider or clinic, [phone]  For questions related to your surgery, please call your surgery clinic        Attending Provider     Provider Specialty    J Carlos Cali MD Thoracic Diseases       Primary Care Provider Office Phone # Fax #    Carlie Costa [phone]      After Care Instructions     Diet Instructions       Resume pre-procedure diet            Discharge Instructions       Follow up appointment as instructed by Surgeon and or RN            Discharge Instructions       Restart your Xarelto tomorrow, 9/15/18.            Do not - immerse incision in water until sutures removed       Do not immerse incision in water/swim for one week.            Dressing       Keep clear Dermabond glue in place.  It will peel off on its own after about 7-10 days.            Shower       No shower for 24 hours post procedure. May shower Postoperative Day (POD)  one                  Further instructions from your care team       Same Day Surgery Discharge Instructions for  Sedation and General Anesthesia       It's not unusual to feel dizzy, light-headed or  faint for up to 24 hours after surgery or while taking pain medication.  If you have these symptoms: sit for a few minutes before standing and have someone assist you when you get up to walk or use the bathroom.      You should rest and relax for the next 24 hours. We recommend you make arrangements to have an adult stay with you for at least 24 hours after your discharge.  Avoid hazardous and strenuous activity.      DO NOT DRIVE any vehicle or operate mechanical equipment for 24 hours following the end of your surgery.  Even though you may feel normal, your reactions may be affected by the medication you have received.      Do not drink alcoholic beverages for 24 hours following surgery.       Slowly progress to your regular diet as you feel able. It's not unusual to feel nauseated and/or vomit after receiving anesthesia.  If you develop these symptoms, drink clear liquids (apple juice, ginger ale, broth, 7-up, etc. ) until you feel better.  If your nausea and vomiting persists for 24 hours, please notify your surgeon.        All narcotic pain medications, along with inactivity and anesthesia, can cause constipation. Drinking plenty of liquids and increasing fiber intake will help.      For any questions of a medical nature, call your surgeon.      Do not make important decisions for 24 hours.      If you had general anesthesia, you may have a sore throat for a couple of days related to the breathing tube used during surgery.  You may use Cepacol lozenges to help with this discomfort.  If it worsens or if you develop a fever, contact your surgeon.       If you feel your pain is not well managed with the pain medications prescribed by your surgeon, please contact your surgeon's office to let them know so they can address your concerns.     Discharge Instructions for Port Removal        You may remove your bandage and shower in 24 hours.     You may resume normal activity as tolerated.      You may apply ice to the  "area for comfort.    Your incision was closed using a liquid adhesive.  Do not scratch, rub or pick at the film over your incision.  You may shower in 24 hours.  Do not soak in a tub for at least one week.  Do not apply lotions or cream to you incision.     Watch incision for signs of infection:  Redness  Swelling  Drainage  Temperature    Call your surgeon for questions and concerns.     **If you have questions or concerns about your procedure,  call Dr. Cali at [phone]**              Pending Results     No orders found from 2018 to 9/15/2018.            Admission Information     Date & Time Provider Department Dept. Phone    2018 J Carlos Cali MD [facility] PACU [phone]      Your Vitals Were     Blood Pressure Temperature Respirations Height Weight Pulse Oximetry    125/72 97.8  F (36.6  C) (Temporal) 16 1.753 m (5' 9\") 108.5 kg (239 lb 1.6 oz) 99%    BMI (Body Mass Index)                   35.31 kg/m2           [employer] Information     [facility] lets you send messages to your doctor, view your test results, renew your prescriptions, schedule appointments and more. To sign up, go to [email or web address] . Click on \"Log in\" on the left side of the screen, which will take you to the Welcome page. Then click on \"Sign up Now\" on the right side of the page.     You will be asked to enter the access code listed below, as well as some personal information. Please follow the directions to create your username and password.     Your access code is: [email or web address]  Expires: 2018 11:47 AM     Your access code will  in 90 days. If you need help or a new code, please call your [place] clinic or [phone].        Care EveryWhere ID     This is your Care EveryWhere ID. This could be used by other organizations to access your [place] medical records  [id number]        Equal Access to Services     FIDE WILD AH: antonio Zimmer qaybta " hollie garzaglen conradaaaugustina ah. Bryanna [facility] [phone].    ATENCIÓN: Si celine lopez, tiene a fernandes disposición servicios gratuitos de asistencia lingüística. Christiano al [phone].    We comply with applicable federal civil rights laws and [place] laws. We do not discriminate on the basis of race, color, national origin, age, disability, sex, sexual orientation, or gender identity.               Review of your medicines      START taking        Dose / Directions    HYDROcodone-acetaminophen 5-325 MG per tablet   Commonly known as:  NORCO   Used for:  Malignant neoplasm of sigmoid (flexure) (H), Acute post-operative pain        Dose:  1 tablet   Take 1 tablet by mouth every 6 hours as needed for moderate to severe pain or other (Moderate to Severe Pain)   Quantity:  4 tablet   Refills:  0         CONTINUE these medicines which have NOT CHANGED        Dose / Directions    ALEVE PO        Dose:  2 tablet   Take 2 tablets by mouth daily as needed for moderate pain   Refills:  0       ASPIRIN PO        Dose:  81 mg   Take 81 mg by mouth daily   Refills:  0       ATORVASTATIN CALCIUM PO        Dose:  40 mg   Take 40 mg by mouth At Bedtime   Refills:  0       BASAGLAR KWIKPEN SC        Dose:  15 Units   Inject 15 Units Subcutaneous every evening   Refills:  0       fluticasone 50 MCG/ACT spray   Commonly known as:  FLONASE        Dose:  1-2 spray   Spray 1-2 sprays into both nostrils daily as needed for rhinitis or allergies   Refills:  0       lisinopril-hydrochlorothiazide 20-25 MG per tablet   Commonly known as:  PRINZIDE/ZESTORETIC        Dose:  1 tablet   Take 1 tablet by mouth every evening   Refills:  0       METFORMIN HCL PO        Dose:  1000 mg   Take 1,000 mg by mouth 2 times daily   Refills:  0       multivitamin, therapeutic with minerals Tabs tablet        Dose:  1 tablet   Take 1 tablet by mouth daily   Refills:  0       OMEGA 3 PO        Dose:  2 tablet   Take 2 tablets by  mouth 2 times daily   Refills:  0       VIAGRA PO        Dose:  100 mg   Take 100 mg by mouth daily as needed   Refills:  0       VITAMIN D (CHOLECALCIFEROL) PO        Dose:  1000 Units   Take 1,000 Units by mouth daily   Refills:  0       XARELTO PO        Dose:  20 mg   Take 20 mg by mouth daily   Refills:  0       ZYRTEC PO        Dose:  10 mg   Take 10 mg by mouth daily as needed   Refills:  0         STOP taking     oxyCODONE IR 5 MG tablet   Commonly known as:  ROXICODONE                Where to get your medicines      Some of these will need a paper prescription and others can be bought over the counter. Ask your nurse if you have questions.     Bring a paper prescription for each of these medications     HYDROcodone-acetaminophen 5-325 MG per tablet                Protect others around you: Learn how to safely use, store and throw away your medicines at www.disposemymeds.org.        Information about OPIOIDS     PRESCRIPTION OPIOIDS: WHAT YOU NEED TO KNOW   We gave you an opioid (narcotic) pain medicine. It is important to manage your pain, but opioids are not always the best choice. You should first try all the other options your care team gave you. Take this medicine for as short a time (and as few doses) as possible.    Some activities can increase your pain, such as bandage changes or therapy sessions. It may help to take your pain medicine 30 to 60 minutes before these activities. Reduce your stress by getting enough sleep, working on hobbies you enjoy and practicing relaxation or meditation. Talk to your care team about ways to manage your pain beyond prescription opioids.    These medicines have risks:    DO NOT drive when on new or higher doses of pain medicine. These medicines can affect your alertness and reaction times, and you could be arrested for driving under the influence (DUI). If you need to use opioids long-term, talk to your care team about driving.    DO NOT operate heavy machinery    DO  NOT do any other dangerous activities while taking these medicines.    DO NOT drink any alcohol while taking these medicines.     If the opioid prescribed includes acetaminophen, DO NOT take with any other medicines that contain acetaminophen. Read all labels carefully. Look for the word  acetaminophen  or  Tylenol.  Ask your pharmacist if you have questions or are unsure.    You can get addicted to pain medicines, especially if you have a history of addiction (chemical, alcohol or substance dependence). Talk to your care team about ways to reduce this risk.    All opioids tend to cause constipation. Drink plenty of water and eat foods that have a lot of fiber, such as fruits, vegetables, prune juice, apple juice and high-fiber cereal. Take a laxative (Miralax, milk of magnesia, Colace, Senna) if you don t move your bowels at least every other day. Other side effects include upset stomach, sleepiness, dizziness, throwing up, tolerance (needing more of the medicine to have the same effect), physical dependence and slowed breathing.    Store your pills in a secure place, locked if possible. We will not replace any lost or stolen medicine. If you don t finish your medicine, please throw away (dispose) as directed by your pharmacist. The [place] Pollution Control Agency has more information about safe disposal: https://www.pca.[facility].[place].us/living-green/managing-unwanted-medications             Medication List: This is a list of all your medications and when to take them. Check marks below indicate your daily home schedule. Keep this list as a reference.      Medications           Morning Afternoon Evening Bedtime As Needed    ALEVE PO   Take 2 tablets by mouth daily as needed for moderate pain                                ASPIRIN PO   Take 81 mg by mouth daily                                ATORVASTATIN CALCIUM PO   Take 40 mg by mouth At Bedtime                                BASAGLJANEL CARLISLEPEN SC   Inject 15 Units  Subcutaneous every evening                                fluticasone 50 MCG/ACT spray   Commonly known as:  FLONASE   Spray 1-2 sprays into both nostrils daily as needed for rhinitis or allergies                                HYDROcodone-acetaminophen 5-325 MG per tablet   Commonly known as:  NORCO   Take 1 tablet by mouth every 6 hours as needed for moderate to severe pain or other (Moderate to Severe Pain)                                lisinopril-hydrochlorothiazide 20-25 MG per tablet   Commonly known as:  PRINZIDE/ZESTORETIC   Take 1 tablet by mouth every evening                                METFORMIN HCL PO   Take 1,000 mg by mouth 2 times daily                                multivitamin, therapeutic with minerals Tabs tablet   Take 1 tablet by mouth daily                                OMEGA 3 PO   Take 2 tablets by mouth 2 times daily                                VIAGRA PO   Take 100 mg by mouth daily as needed                                VITAMIN D (CHOLECALCIFEROL) PO   Take 1,000 Units by mouth daily                                XARELTO PO   Take 20 mg by mouth daily                                ZYRTEC PO   Take 10 mg by mouth daily as needed

## 2018-09-14 NOTE — IP AVS SNAPSHOT
[place] [address]    [place] [address]    Phone:  [phone]                                       After Visit Summary   9/14/2018    Edilberto Zhao    MRN: [id number]           After Visit Summary Signature Page     I have received my discharge instructions, and my questions have been answered. I have discussed any challenges I see with this plan with the nurse or doctor.    ..........................................................................................................................................  Patient/Patient Representative Signature      ..........................................................................................................................................  Patient Representative Print Name and Relationship to Patient    ..................................................               ................................................  Date                                   Time    ..........................................................................................................................................  Reviewed by Signature/Title    ...................................................              ..............................................  Date                                               Time          22EPIC Rev 08/18

## 2018-09-14 NOTE — ANESTHESIA POSTPROCEDURE EVALUATION
Patient: Edilberto Zhao    Procedure(s):  PORT REMOVAL  - Wound Class: I-Clean    Diagnosis:colon cancer  Diagnosis Additional Information: No value filed.    Anesthesia Type:  MAC    Note:  Anesthesia Post Evaluation    Patient location during evaluation: Phase 2  Patient participation: Able to fully participate in evaluation  Level of consciousness: awake and alert  Pain management: adequate  Airway patency: patent  Cardiovascular status: hemodynamically stable and acceptable  Respiratory status: acceptable and room air  Hydration status: euvolemic  PONV: none     Anesthetic complications: None          Last vitals:  Vitals:    09/14/18 1147 09/14/18 1200 09/14/18 1215   BP: 125/72 110/86 117/80   Resp: 16 16 16   Temp: 36.6  C (97.8  F)     SpO2: 99% 94% 95%         Electronically Signed By: Oni Harkins MD  September 14, 2018  1:28 PM

## 2018-09-14 NOTE — OP NOTE
DATE OF PROCEDURE: 09/14/2018      SURGEON:  J Carlos Cali MD       FIRST ASSISTANT:         PREOPERATIVE DIAGNOSIS:  sigmoid colon cancer      POSTOPERATIVE DIAGNOSIS:  Same       PROCEDURE:  Removal of PowerPort implanted port, right internal jugularvein.       ANESTHESIA:  Local with lidocaine 1% without epinephrine and sedation.       INDICATIONS:  Patient had a port placed for treatment.  Therapy has now been completed and a PowerPort was no longer necessary.       DESCRIPTION OF PROCEDURE:  The patient was brought to the OR and placed in supine position.  IV sedation was given.  The right infraclavicular area was prepared and draped in the usual fashion using ChloraPrep.  Local anesthesia was done with lidocaine 1% without epinephrine.  The infraclavicular incision was opened.  The port was dissected from the subcutaneous pocket and port and the catheter were removed.  The catheter was intact.  The tract of the catheter was closed with a figure-of-eight of 3-0 Vicryl.  Hemostasis was excellent.  Incision was closed in the usual fashion.  Estimated blood loss minimal.  Sponge and needle counts correct.           J CARLOS CALI MD

## 2018-09-14 NOTE — ANESTHESIA CARE TRANSFER NOTE
Patient: Edilberto Zhao    Procedure(s):  PORT REMOVAL  - Wound Class: I-Clean    Diagnosis: colon cancer  Diagnosis Additional Information: No value filed.    Anesthesia Type:   MAC     Note:  Airway :Room Air  Patient transferred to:PACU  Comments: At end of procedure, spontaneous respirations, patient alert to voice, able to follow commands. Patient breathing room air at room air to PACU. Oxygen tubing connected to wall O2 in PACU, SpO2, NiBP, and EKG monitors and alarms on and functioning, Annette Hugger warmer connected to patient gown, report on patient's clinical status given to PACU RN, RN questions answered.Handoff Report: Identifed the Patient, Identified the Reponsible Provider, Reviewed the pertinent medical history, Discussed the surgical course, Reviewed Intra-OP anesthesia mangement and issues during anesthesia, Set expectations for post-procedure period and Allowed opportunity for questions and acknowledgement of understanding      Vitals: (Last set prior to Anesthesia Care Transfer)    CRNA VITALS  9/14/2018 1113 - 9/14/2018 1148      9/14/2018             Resp Rate (set): 10                Electronically Signed By: HENNY Ramirez CRNA  September 14, 2018  11:48 AM

## 2018-09-14 NOTE — ANESTHESIA PREPROCEDURE EVALUATION
Procedure: Procedure(s):  REMOVE PORT VASCULAR ACCESS  Preop diagnosis: colon cancer    No Known Allergies  Past Medical History:   Diagnosis Date     Adenocarcinoma of sigmoid colon (H)      Allergic rhinitis      Diabetes (H)     type 2     Erectile dysfunction      Hyperlipidemia      Hypertension      Past Surgical History:   Procedure Laterality Date     COLONOSCOPY       LAPAROSCOPIC ASSISTED SIGMOID COLECTOMY N/A 1/4/2018    Procedure: LAPAROSCOPIC ASSISTED SIGMOID COLECTOMY;  LAPAROSCOPIC ASSISTED Anterior SIGMOID RESECTION;  Surgeon: Chiquis Pierre MD;  Location: [facility] OR     wisdom teeth removal       Social History   Substance Use Topics     Smoking status: Never Smoker     Smokeless tobacco: Never Used     Alcohol use Yes      Comment: 4 beers/ month     Prior to Admission medications    Medication Sig Start Date End Date Taking? Authorizing Provider   Cetirizine HCl (ZYRTEC PO) Take 10 mg by mouth daily as needed   Yes Reported, Patient   Insulin Glargine (BASAGLAR KWIKPEN SC) Inject 15 Units Subcutaneous every evening    Yes Reported, Patient   lisinopril-hydrochlorothiazide (PRINZIDE/ZESTORETIC) 20-25 MG per tablet Take 1 tablet by mouth every evening   Yes Reported, Patient   METFORMIN HCL PO Take 1,000 mg by mouth 2 times daily   Yes Reported, Patient   multivitamin, therapeutic with minerals (THERA-VIT-M) TABS tablet Take 1 tablet by mouth daily   Yes Reported, Patient   Naproxen Sodium (ALEVE PO) Take 2 tablets by mouth daily as needed for moderate pain   Yes Reported, Patient   oxyCODONE IR (ROXICODONE) 5 MG tablet Take 1 tablet (5 mg) by mouth every 4 hours as needed for moderate to severe pain 1/6/18  Yes Chiquis Crouch MD   Rivaroxaban (XARELTO PO) Take 20 mg by mouth daily   Yes Reported, Patient   VITAMIN D, CHOLECALCIFEROL, PO Take 1,000 Units by mouth daily   Yes Reported, Patient   ASPIRIN PO Take 81 mg by mouth daily    Reported, Patient   ATORVASTATIN CALCIUM PO Take 40 mg by  mouth At Bedtime     Reported, Patient   fluticasone (FLONASE) 50 MCG/ACT spray Spray 1-2 sprays into both nostrils daily as needed for rhinitis or allergies    Reported, Patient   Omega-3 Fatty Acids (OMEGA 3 PO) Take 2 tablets by mouth 2 times daily    Reported, Patient   Sildenafil Citrate (VIAGRA PO) Take 100 mg by mouth daily as needed    Reported, Patient     No current Epic-ordered facility-administered medications on file.      No current [facility]-ordered outpatient prescriptions on file.       Wt Readings from Last 1 Encounters:   09/14/18 108.5 kg (239 lb 1.6 oz)     Temp Readings from Last 1 Encounters:   09/14/18 36.7  C (98.1  F) (Temporal)     BP Readings from Last 6 Encounters:   09/14/18 115/78   05/04/18 108/67   02/01/18 105/65   01/06/18 137/85     Pulse Readings from Last 4 Encounters:   05/04/18 98   02/01/18 63   01/05/18 73     Resp Readings from Last 1 Encounters:   09/14/18 18     SpO2 Readings from Last 1 Encounters:   09/14/18 96%     Recent Labs   Lab Test  01/05/18   0745  01/04/18   2130   NA  137  133   POTASSIUM  4.1  3.9   CHLORIDE  100  98   CO2  30  27   ANIONGAP  7   --    GLC  219*  269*   BUN  9  11   CR  0.95  0.98   FREDERICK  8.4*  8.5     No results for input(s): AST, ALT, ALKPHOS, BILITOTAL, LIPASE in the last 55805 hours.  Recent Labs   Lab Test  05/04/18   1055  02/01/18   1112   WBC  3.3*  4.4   HGB  13.2*  12.8*   PLT  115*  190     No results for input(s): ABO, RH in the last 09709 hours.  Recent Labs   Lab Test  05/04/18   1055  02/01/18   1112   INR  0.95  0.92   PTT  29  34      No results for input(s): TROPI in the last 55448 hours.  No results for input(s): PH, PCO2, PO2, HCO3 in the last 85762 hours.  No results for input(s): HCG in the last 01659 hours.  No results found for this or any previous visit (from the past 744 hour(s)).    RECENT LABS:   ECG:   ECHO:         Anesthesia Evaluation     .             ROS/MED HX    ENT/Pulmonary:     (+)allergic rhinitis, , . .    (-) tobacco use and sleep apnea   Neurologic:       Cardiovascular:     (+) Dyslipidemia, hypertension----. Taking blood thinners : . . . :. .      (-) arrhythmias   METS/Exercise Tolerance:     Hematologic:         Musculoskeletal:         GI/Hepatic:        (-) GERD   Renal/Genitourinary:         Endo:     (+) type II DM thyroid problem  Thyroid disease - Other, .      Psychiatric:         Infectious Disease:         Malignancy:         Other:                     Physical Exam  Normal systems: cardiovascular, pulmonary and dental    Airway   Mallampati: III  TM distance: >3 FB  Neck ROM: full    Dental     Cardiovascular       Pulmonary                         Anesthesia Plan      History & Physical Review  History and physical reviewed and following examination; no interval change.    ASA Status:  2 .    NPO Status:  > 8 hours    Plan for MAC with Intravenous and Propofol induction. Maintenance will be TIVA.    PONV prophylaxis:  Ondansetron (or other 5HT-3)       Postoperative Care  Postoperative pain management:  IV analgesics and Oral pain medications.      Consents  Anesthetic plan, risks, benefits and alternatives discussed with:  Patient and Spouse..                          .

## 2018-09-14 NOTE — DISCHARGE INSTRUCTIONS
Same Day Surgery Discharge Instructions for  Sedation and General Anesthesia       It's not unusual to feel dizzy, light-headed or faint for up to 24 hours after surgery or while taking pain medication.  If you have these symptoms: sit for a few minutes before standing and have someone assist you when you get up to walk or use the bathroom.      You should rest and relax for the next 24 hours. We recommend you make arrangements to have an adult stay with you for at least 24 hours after your discharge.  Avoid hazardous and strenuous activity.      DO NOT DRIVE any vehicle or operate mechanical equipment for 24 hours following the end of your surgery.  Even though you may feel normal, your reactions may be affected by the medication you have received.      Do not drink alcoholic beverages for 24 hours following surgery.       Slowly progress to your regular diet as you feel able. It's not unusual to feel nauseated and/or vomit after receiving anesthesia.  If you develop these symptoms, drink clear liquids (apple juice, ginger ale, broth, 7-up, etc. ) until you feel better.  If your nausea and vomiting persists for 24 hours, please notify your surgeon.        All narcotic pain medications, along with inactivity and anesthesia, can cause constipation. Drinking plenty of liquids and increasing fiber intake will help.      For any questions of a medical nature, call your surgeon.      Do not make important decisions for 24 hours.      If you had general anesthesia, you may have a sore throat for a couple of days related to the breathing tube used during surgery.  You may use Cepacol lozenges to help with this discomfort.  If it worsens or if you develop a fever, contact your surgeon.       If you feel your pain is not well managed with the pain medications prescribed by your surgeon, please contact your surgeon's office to let them know so they can address your concerns.     Discharge Instructions for Port  Removal        You may remove your bandage and shower in 24 hours.     You may resume normal activity as tolerated.      You may apply ice to the area for comfort.    Your incision was closed using a liquid adhesive.  Do not scratch, rub or pick at the film over your incision.  You may shower in 24 hours.  Do not soak in a tub for at least one week.  Do not apply lotions or cream to you incision.     Watch incision for signs of infection:  Redness  Swelling  Drainage  Temperature    Call your surgeon for questions and concerns.     **If you have questions or concerns about your procedure,  call Dr. Cali at [phone]**

## 2020-01-24 ENCOUNTER — HOSPITAL ENCOUNTER (EMERGENCY)
Facility: CLINIC | Age: 55
Discharge: HOME | End: 2020-01-24
Attending: EMERGENCY MEDICINE | Admitting: EMERGENCY MEDICINE
Payer: OTHER MISCELLANEOUS

## 2020-01-24 ENCOUNTER — APPOINTMENT (OUTPATIENT)
Dept: GENERAL RADIOLOGY | Facility: CLINIC | Age: 55
End: 2020-01-24
Attending: EMERGENCY MEDICINE
Payer: OTHER MISCELLANEOUS

## 2020-01-24 VITALS
WEIGHT: 270 LBS | OXYGEN SATURATION: 98 % | TEMPERATURE: 96.9 F | HEIGHT: 69 IN | BODY MASS INDEX: 39.99 KG/M2 | DIASTOLIC BLOOD PRESSURE: 83 MMHG | HEART RATE: 78 BPM | SYSTOLIC BLOOD PRESSURE: 154 MMHG | RESPIRATION RATE: 16 BRPM

## 2020-01-24 DIAGNOSIS — S93.601A SPRAIN OF RIGHT FOOT, INITIAL ENCOUNTER: ICD-10-CM

## 2020-01-24 PROCEDURE — 99283 EMERGENCY DEPT VISIT LOW MDM: CPT

## 2020-01-24 PROCEDURE — 73630 X-RAY EXAM OF FOOT: CPT | Mod: RT

## 2020-01-24 ASSESSMENT — MIFFLIN-ST. JEOR: SCORE: 2055.09

## 2020-01-24 NOTE — ED PROVIDER NOTES
"  History   Chief Complaint:  Foot Injury     HPI   Edilberto Zhao is a 54 year old male with history of hypertension, hyperlipidemia, diabetes mellitus, and adenocarcinoma of sigmoid colon in remission for 15 months status post laparoscopic assisted sigmoid colectomy who presents with foot pain. The patient reports he was pushing off as he was stepping over a puddle this morning at 0500, and he heard a pop and a snap in the right foot. He endorses the greatest pain on the plantar aspect the right foot in the fifth metatarsal region. The patient notes he does have arthritis, but it is only present on his elbows and knee during the winter. He denies a history of psoriatic arthritis stating it is limited to his skin. 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 The patient denies metastasis of his colon cancer, and states that he never received radiation.     Allergies:  No Known Allergies    Medications:   Atorvastatin  Basaglar  Lisinopril-hydrochlorothiazide   Metformin  Viagra     Past Medical History:    Adenocarcinoma of sigmoid colon   Allergic rhinitis   Diabetes  Erectile dysfunction  hyperlipidemia     Hypertension   Type 2 diabetes     Past Surgical History:    laparoscopic assisted sigmoid colectomy  Remove vascular port  [place] teeth removal      Family History:    Father: diabetes     Social History:  The patient was accompanied to the ED by wife.  Smoking Status: Never Smoker  Smokeless Tobacco: Never Used  Alcohol Use: Yes  Drug Use: No  PCP: Carlie Costa   Works as a      Review of Systems   Musculoskeletal:        Right foot pain along the fifth metatarsal    Skin: Positive for rash.   All other systems reviewed and are negative.      Physical Exam     Patient Vitals for the past 24 hrs:   BP Temp Temp src Pulse Resp SpO2 Height Weight   01/24/20 0648 (!) 154/83 96.9  F (36.1  C) Temporal 78 16 98 % 1.753 m (5' 9\") 122.5 kg (270 lb)       Physical Exam  MS:  Right Foot:    There " is tenderness involving the head and neck of the fifth metatarsal.     Fifth metatarsal base and the insertion of the peroneus brevis tendon is   nontender.     There is mild tenderness throughout the plantar fascia.     There is no ecchymosis to the dorsal or plantar aspect of the foot.     There is no evidence of skin based infection or cellulitis.     There is normal dorsalis pedis arterial pulsation    There is normal posterior tibial arterial pulsation    There is normal capillary refill to the toes    The talus, navicular, tarsals, and metatarsals are without obvious fracture    The toes appears grossly normal    There is normal sensation to the foot    The flexors and extensors to the toes are normal    Dorsiflexion and Plantarflexion to the ankle/foot is normal  Skin:   There is a 2 cm psoriatic plaque to the right knee.   Emergency Department Course   Imaging:  Radiology findings were communicated with the patient who voiced understanding of the findings.    Foot  XR, G/E 3 views, right  Normal joint spacing and alignment. No fracture. Mild  hypertrophic changes in the dorsal midfoot. Calcaneus enthesophytes.  No soft tissue abnormality is evident.  Reading per radiology    Emergency Department Course:  Nursing notes and vitals reviewed.    0701 I performed an exam of the patient as documented above.     The patient was sent for a XR Foot Right while in the emergency department, results above.      0730 I rechecked the patient. Explained findings to the Patient and spouse.    Findings and plan explained to the Patient and spouse. Patient discharged home with instructions regarding supportive care, medications, and reasons to return. The importance of close follow-up was reviewed.     Impression & Plan    Medical Decision Making:  Edilberto Zhao is a 54 year old male who presents to the emergency department today for evaluation of right foot pain.  The patient did have a slightly traumatic mechanism in that  he was placing his foot down to step over a slushy puddle and then felt a slight pop or snap in his foot.  On examination there is mild tenderness involving the fifth metatarsal head and neck region but there is no evidence of bruising or marked pain.  X-ray shows no fracture in this location.  The differential diagnosis could include a stress fracture that is not seen on plain radiography.  Also in the differential diagnosis would be a foot sprain from some intraosseous ligaments in this area or musculotendinous insertions.  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  These areas are not tender and are not the etiology of the patient's pain at this juncture.    Patient was advised that he can heel walk, ice the foot and take some ibuprofen or Tylenol over the weekend.  He will be taken off of work today and he does not work on Saturday and Sunday.  His next day back to work would be Monday.  If the patient's pain is still significant on Monday he will have to follow-up with primary care for a work release extension and potential further work-up.  MRI of the foot is certainly more sensitive for ligamentous injuries or stress fracture of the fifth metatarsal.  Those are not indicated at this juncture.    Is no evidence of gout or psoriatic arthropathy at this time.    Diagnosis:    ICD-10-CM    1. Sprain of right foot, initial encounter S93.601A        Disposition:   discharged to home    Scribe Disclosure:  Connie MURRAY, am serving as a scribe at 7:01 AM on 1/24/2020 to document services personally performed by Hussain Anderson MD based on my observations and the provider's statements to me.   [facility] EMERGENCY DEPARTMENT       Hussain Anderson MD  01/24/20 0839

## 2020-01-24 NOTE — ED AVS SNAPSHOT
Emergency Department  [address] [facility] [address]  Phone:  [phone]  Fax:  [phone]                                    Edilberto Zhao   MRN: [id number]    Department:   Emergency Department   Date of Visit:  1/24/2020           After Visit Summary Signature Page    I have received my discharge instructions, and my questions have been answered. I have discussed any challenges I see with this plan with the nurse or doctor.    ..........................................................................................................................................  Patient/Patient Representative Signature      ..........................................................................................................................................  Patient Representative Print Name and Relationship to Patient    ..................................................               ................................................  Date                                   Time    ..........................................................................................................................................  Reviewed by Signature/Title    ...................................................              ..............................................  Date                                               Time          22EPIC Rev 08/18

## 2020-01-24 NOTE — LETTER
January 24, 2020      To Whom It May Concern:      Edilberto Zhao was seen in our Emergency Department today, 01/24/20.  I expect his condition to improve over the next few days.  He may return to work when improved, on Monday, January 27    Sincerely,        Hussain Anderson MD

## 2020-01-24 NOTE — DISCHARGE INSTRUCTIONS
Take it easy for the next 3 days.  You may find heel walking superior to putting pressure on the front of your foot.  Ice 3 times a day for 20 to 30 minutes.  Ibuprofen or Tylenol as needed for pain.  See your primary care doctor on Monday or Tuesday if things are painful such that you cannot work.  No work today.

## 2021-10-06 NOTE — ED NOTES
Bed: ED26  Expected date: 1/24/20  Expected time:   Means of arrival:   Comments:  triage  
room air

## 2021-11-05 DIAGNOSIS — Z11.59 ENCOUNTER FOR SCREENING FOR OTHER VIRAL DISEASES: ICD-10-CM

## 2021-12-03 ENCOUNTER — LAB (OUTPATIENT)
Dept: URGENT CARE | Facility: URGENT CARE | Age: 56
End: 2021-12-03
Attending: COLON & RECTAL SURGERY
Payer: COMMERCIAL

## 2021-12-03 DIAGNOSIS — Z11.59 ENCOUNTER FOR SCREENING FOR OTHER VIRAL DISEASES: ICD-10-CM

## 2021-12-03 PROCEDURE — U0005 INFEC AGEN DETEC AMPLI PROBE: HCPCS

## 2021-12-03 PROCEDURE — U0003 INFECTIOUS AGENT DETECTION BY NUCLEIC ACID (DNA OR RNA); SEVERE ACUTE RESPIRATORY SYNDROME CORONAVIRUS 2 (SARS-COV-2) (CORONAVIRUS DISEASE [COVID-19]), AMPLIFIED PROBE TECHNIQUE, MAKING USE OF HIGH THROUGHPUT TECHNOLOGIES AS DESCRIBED BY CMS-2020-01-R: HCPCS

## 2021-12-04 LAB — SARS-COV-2 RNA RESP QL NAA+PROBE: NEGATIVE

## 2021-12-07 ENCOUNTER — HOSPITAL ENCOUNTER (OUTPATIENT)
Facility: CLINIC | Age: 56
Discharge: HOME | End: 2021-12-07
Attending: COLON & RECTAL SURGERY | Admitting: COLON & RECTAL SURGERY
Payer: COMMERCIAL

## 2021-12-07 VITALS
HEART RATE: 66 BPM | SYSTOLIC BLOOD PRESSURE: 98 MMHG | WEIGHT: 270 LBS | RESPIRATION RATE: 16 BRPM | BODY MASS INDEX: 39.99 KG/M2 | DIASTOLIC BLOOD PRESSURE: 70 MMHG | OXYGEN SATURATION: 96 % | HEIGHT: 69 IN

## 2021-12-07 LAB
COLONOSCOPY: NORMAL
GLUCOSE BLDC GLUCOMTR-MCNC: 147 MG/DL (ref 70–99)

## 2021-12-07 PROCEDURE — 45385 COLONOSCOPY W/LESION REMOVAL: CPT | Mod: PT | Performed by: COLON & RECTAL SURGERY

## 2021-12-07 PROCEDURE — 258N000003 HC RX IP 258 OP 636: Performed by: COLON & RECTAL SURGERY

## 2021-12-07 PROCEDURE — 250N000011 HC RX IP 250 OP 636: Performed by: COLON & RECTAL SURGERY

## 2021-12-07 PROCEDURE — 88305 TISSUE EXAM BY PATHOLOGIST: CPT | Mod: TC | Performed by: COLON & RECTAL SURGERY

## 2021-12-07 PROCEDURE — G0500 MOD SEDAT ENDO SERVICE >5YRS: HCPCS | Performed by: COLON & RECTAL SURGERY

## 2021-12-07 PROCEDURE — 82962 GLUCOSE BLOOD TEST: CPT

## 2021-12-07 RX ORDER — FLUMAZENIL 0.1 MG/ML
0.2 INJECTION, SOLUTION INTRAVENOUS
Status: DISCONTINUED | OUTPATIENT
Start: 2021-12-07 | End: 2021-12-07 | Stop reason: HOSPADM

## 2021-12-07 RX ORDER — NALOXONE HYDROCHLORIDE 0.4 MG/ML
0.2 INJECTION, SOLUTION INTRAMUSCULAR; INTRAVENOUS; SUBCUTANEOUS
Status: DISCONTINUED | OUTPATIENT
Start: 2021-12-07 | End: 2021-12-07 | Stop reason: HOSPADM

## 2021-12-07 RX ORDER — PROCHLORPERAZINE MALEATE 10 MG
10 TABLET ORAL EVERY 6 HOURS PRN
Status: DISCONTINUED | OUTPATIENT
Start: 2021-12-07 | End: 2021-12-07 | Stop reason: HOSPADM

## 2021-12-07 RX ORDER — FENTANYL CITRATE 50 UG/ML
25-50 INJECTION, SOLUTION INTRAMUSCULAR; INTRAVENOUS
Status: DISCONTINUED | OUTPATIENT
Start: 2021-12-07 | End: 2021-12-07 | Stop reason: HOSPADM

## 2021-12-07 RX ORDER — ONDANSETRON 4 MG/1
4 TABLET, ORALLY DISINTEGRATING ORAL EVERY 6 HOURS PRN
Status: DISCONTINUED | OUTPATIENT
Start: 2021-12-07 | End: 2021-12-07 | Stop reason: HOSPADM

## 2021-12-07 RX ORDER — FENTANYL CITRATE 50 UG/ML
50-100 INJECTION, SOLUTION INTRAMUSCULAR; INTRAVENOUS
Status: COMPLETED | OUTPATIENT
Start: 2021-12-07 | End: 2021-12-07

## 2021-12-07 RX ORDER — SIMETHICONE 40MG/0.6ML
133 SUSPENSION, DROPS(FINAL DOSAGE FORM)(ML) ORAL
Status: DISCONTINUED | OUTPATIENT
Start: 2021-12-07 | End: 2021-12-07 | Stop reason: HOSPADM

## 2021-12-07 RX ORDER — NALOXONE HYDROCHLORIDE 0.4 MG/ML
0.4 INJECTION, SOLUTION INTRAMUSCULAR; INTRAVENOUS; SUBCUTANEOUS
Status: DISCONTINUED | OUTPATIENT
Start: 2021-12-07 | End: 2021-12-07 | Stop reason: HOSPADM

## 2021-12-07 RX ORDER — ATROPINE SULFATE 0.4 MG/ML
0.4 AMPUL (ML) INJECTION
Status: DISCONTINUED | OUTPATIENT
Start: 2021-12-07 | End: 2021-12-07 | Stop reason: HOSPADM

## 2021-12-07 RX ORDER — ONDANSETRON 2 MG/ML
4 INJECTION INTRAMUSCULAR; INTRAVENOUS EVERY 6 HOURS PRN
Status: DISCONTINUED | OUTPATIENT
Start: 2021-12-07 | End: 2021-12-07 | Stop reason: HOSPADM

## 2021-12-07 RX ORDER — ONDANSETRON 2 MG/ML
4 INJECTION INTRAMUSCULAR; INTRAVENOUS
Status: DISCONTINUED | OUTPATIENT
Start: 2021-12-07 | End: 2021-12-07 | Stop reason: HOSPADM

## 2021-12-07 RX ORDER — EPINEPHRINE 1 MG/ML
0.1 INJECTION, SOLUTION INTRAMUSCULAR; SUBCUTANEOUS
Status: DISCONTINUED | OUTPATIENT
Start: 2021-12-07 | End: 2021-12-07 | Stop reason: HOSPADM

## 2021-12-07 RX ORDER — LIDOCAINE 40 MG/G
CREAM TOPICAL
Status: DISCONTINUED | OUTPATIENT
Start: 2021-12-07 | End: 2021-12-07 | Stop reason: HOSPADM

## 2021-12-07 RX ADMIN — SODIUM CHLORIDE 500 ML: 9 INJECTION, SOLUTION INTRAVENOUS at 11:30

## 2021-12-07 RX ADMIN — FENTANYL CITRATE 50 MCG: 50 INJECTION INTRAMUSCULAR; INTRAVENOUS at 10:48

## 2021-12-07 RX ADMIN — MIDAZOLAM 1 MG: 1 INJECTION INTRAMUSCULAR; INTRAVENOUS at 10:48

## 2021-12-07 RX ADMIN — SODIUM CHLORIDE 500 ML: 9 INJECTION, SOLUTION INTRAVENOUS at 10:45

## 2021-12-07 ASSESSMENT — MIFFLIN-ST. JEOR: SCORE: 2045.09

## 2021-12-07 NOTE — DISCHARGE INSTRUCTIONS
Understanding Colon and Rectal Polyps     The colon has a smooth lining composed of millions of cells.     The colon (also called the large intestine) is a muscular tube that forms the last part of the digestive tract. It absorbs water and stores food waste. The colon is about 4 to 6 feet long. The rectum is the last 6 inches of the colon. The colon and rectum have a smooth lining composed of millions of cells. Changes in these cells can lead to growths in the colon that can become cancerous and should be removed.     When the Colon Lining Changes  Changes that occur in the cells that line the colon or rectum can lead to growths called polyps. Over a period of years, polyps can turn cancerous. Removing polyps early may prevent cancer from ever forming.      Polyps  Polyps are fleshy clumps of tissue that form on the lining of the colon or rectum. Small polyps are usually benign (not cancerous). However, over time, cells in a polyp can change and become cancerous. The larger a polyp grows, the more likely this is to happen. Also, certain types of polyps known as adenomatous polyps are considered premalignant. This means that they will almost always become cancerous if they re not removed.          Cancer  Almost all colorectal cancers start when polyp cells begin growing abnormally. As a cancerous tumor grows, it may involve more and more of the colon or rectum. In time, cancer can also grow beyond the colon or rectum and spread to nearby organs or to glands called lymph nodes. The cells can also travel to other parts of the body. This is known as metastasis. The earlier a cancerous tumor is removed, the better the chance of preventing its spread.        4420-9242 [employer], [address] [place] [address], [place] [address]. All rights reserved. This information is not intended as a substitute for professional medical care. Always follow your healthcare professional's instructions.

## 2021-12-07 NOTE — H&P
Pre-Endoscopy History and Physical     Edilberto Zhao MRN# [id number]   YOB: 1965 Age: 56 year old     Date of Procedure: 12/7/2021  Primary care provider: Leela Dominguez  Type of Endoscopy: colonoscopy  Reason for Procedure: surveillance  Type of Anesthesia Anticipated: Moderate Sedation    HPI:    Edilberto is a 56 year old male who will be undergoing the above procedure.      A history and physical has been performed. The patient's medications and allergies have been reviewed. The risks and benefits of the procedure and the sedation options and risks were discussed with the patient.  All questions were answered and informed consent was obtained.      He denies a personal or family history of anesthesia complications or bleeding disorders.     No Known Allergies     Prior to Admission Medications   Prescriptions Last Dose Informant Patient Reported? Taking?   ASPIRIN PO   Yes Yes   Sig: Take 81 mg by mouth daily   ATORVASTATIN CALCIUM PO   Yes Yes   Sig: Take 40 mg by mouth At Bedtime    Cetirizine HCl (ZYRTEC PO)   Yes Yes   Sig: Take 10 mg by mouth daily as needed   HYDROcodone-acetaminophen (NORCO) 5-325 MG per tablet   No No   Sig: Take 1 tablet by mouth every 6 hours as needed for moderate to severe pain or other (Moderate to Severe Pain)   Insulin Glargine (BASAGLAR KWIKPEN SC)   Yes Yes   Sig: Inject 15 Units Subcutaneous every evening    METFORMIN HCL PO   Yes Yes   Sig: Take 1,000 mg by mouth 2 times daily   Naproxen Sodium (ALEVE PO)   Yes No   Sig: Take 2 tablets by mouth daily as needed for moderate pain   Omega-3 Fatty Acids (OMEGA 3 PO)   Yes Yes   Sig: Take 2 tablets by mouth 2 times daily   Sildenafil Citrate (VIAGRA PO)   Yes No   Sig: Take 100 mg by mouth daily as needed   VITAMIN D, CHOLECALCIFEROL, PO   Yes No   Sig: Take 1,000 Units by mouth daily   dulaglutide (TRULICITY) 0.75 MG/0.5ML pen   Yes Yes   Sig: Inject 0.75 mg Subcutaneous every 7 days   fluticasone (FLONASE) 50  "MCG/ACT spray   Yes Yes   Sig: Spray 1-2 sprays into both nostrils daily as needed for rhinitis or allergies   lisinopril-hydrochlorothiazide (PRINZIDE/ZESTORETIC) 20-25 MG per tablet   Yes Yes   Sig: Take 1 tablet by mouth every evening   multivitamin, therapeutic with minerals (THERA-VIT-M) TABS tablet   Yes Yes   Sig: Take 1 tablet by mouth daily      Facility-Administered Medications: None       Patient Active Problem List   Diagnosis     Malignant neoplasm of sigmoid (flexure) (H)        Past Medical History:   Diagnosis Date     Adenocarcinoma of sigmoid colon (H)      Allergic rhinitis      Diabetes (H)     type 2     Erectile dysfunction      Hyperlipidemia      Hypertension         Past Surgical History:   Procedure Laterality Date     COLONOSCOPY       LAPAROSCOPIC ASSISTED SIGMOID COLECTOMY N/A 1/4/2018    Procedure: LAPAROSCOPIC ASSISTED SIGMOID COLECTOMY;  LAPAROSCOPIC ASSISTED Anterior SIGMOID RESECTION;  Surgeon: Chiquis Pierre MD;  Location:  OR     REMOVE PORT VASCULAR ACCESS N/A 9/14/2018    Procedure: REMOVE PORT VASCULAR ACCESS;  PORT REMOVAL ;  Surgeon: J Carlos Cali MD;  Location:  OR     wisdom teeth removal         Social History     Tobacco Use     Smoking status: Never Smoker     Smokeless tobacco: Never Used   Substance Use Topics     Alcohol use: Yes     Comment: 4 beers/ month       History reviewed. No pertinent family history.    REVIEW OF SYSTEMS:     5 point ROS negative except as noted above in HPI, including Gen., Resp., CV, GI &  system review.      PHYSICAL EXAM:   Ht 1.753 m (5' 9\")   Wt 122.5 kg (270 lb)   BMI 39.87 kg/m   Estimated body mass index is 39.87 kg/m  as calculated from the following:    Height as of this encounter: 1.753 m (5' 9\").    Weight as of this encounter: 122.5 kg (270 lb).   GENERAL APPEARANCE: healthy and alert  MENTAL STATUS: alert  AIRWAY EXAM: Mallampatti Class II (visualization of the soft palate, fauces, and uvula)  RESP: " lungs clear to auscultation - no rales, rhonchi or wheezes  CV: regular rates and rhythm      DIAGNOSTICS:    Not indicated      IMPRESSION   ASA Class 2 - Mild systemic disease        PLAN:       Plan for colonoscopy. We discussed the risks, benefits and alternatives and the patient wished to proceed.    The above has been forwarded to the consulting provider.      Signed Electronically by: Chiquis Pierre MD, MD  December 7, 2021

## 2021-12-08 LAB
PATH REPORT.COMMENTS IMP SPEC: NORMAL
PATH REPORT.COMMENTS IMP SPEC: NORMAL
PATH REPORT.FINAL DX SPEC: NORMAL
PATH REPORT.GROSS SPEC: NORMAL
PATH REPORT.MICROSCOPIC SPEC OTHER STN: NORMAL
PHOTO IMAGE: NORMAL

## 2021-12-08 PROCEDURE — 88305 TISSUE EXAM BY PATHOLOGIST: CPT | Mod: 26

## 2021-12-18 ENCOUNTER — HEALTH MAINTENANCE LETTER (OUTPATIENT)
Age: 56
End: 2021-12-18

## 2022-06-27 ENCOUNTER — ANCILLARY PROCEDURE (OUTPATIENT)
Dept: CT IMAGING | Facility: CLINIC | Age: 57
End: 2022-06-27
Attending: INTERNAL MEDICINE
Payer: COMMERCIAL

## 2022-06-27 DIAGNOSIS — C18.7 MALIGNANT NEOPLASM OF SIGMOID COLON (H): ICD-10-CM

## 2022-06-27 LAB
CREAT BLD-MCNC: 1.3 MG/DL (ref 0.7–1.3)
GFR SERPL CREATININE-BSD FRML MDRD: >60 ML/MIN/1.73M2

## 2022-06-27 PROCEDURE — 74177 CT ABD & PELVIS W/CONTRAST: CPT

## 2022-06-27 PROCEDURE — 255N000002 HC RX 255 OP 636: Performed by: INTERNAL MEDICINE

## 2022-06-27 PROCEDURE — 82565 ASSAY OF CREATININE: CPT

## 2022-06-27 RX ADMIN — IOHEXOL 100 ML: 350 INJECTION, SOLUTION INTRAVENOUS at 15:15

## 2022-10-10 ENCOUNTER — HEALTH MAINTENANCE LETTER (OUTPATIENT)
Age: 57
End: 2022-10-10

## 2023-02-18 ENCOUNTER — HEALTH MAINTENANCE LETTER (OUTPATIENT)
Age: 58
End: 2023-02-18

## 2023-04-13 NOTE — OR NURSING
Care assumed from Helena Keller RN.  
Dr. Muller notified of bg [phone].  Order received for 4 units of regular insulin.  
verbal cues/1 person assist

## 2023-07-03 ENCOUNTER — ANCILLARY PROCEDURE (OUTPATIENT)
Dept: CT IMAGING | Facility: CLINIC | Age: 58
End: 2023-07-03
Attending: INTERNAL MEDICINE
Payer: COMMERCIAL

## 2023-07-03 DIAGNOSIS — C18.9 PRIMARY MALIGNANT NEOPLASM OF COLON (H): ICD-10-CM

## 2023-07-03 LAB
CREAT BLD-MCNC: 1.3 MG/DL (ref 0.7–1.3)
GFR SERPL CREATININE-BSD FRML MDRD: >60 ML/MIN/1.73M2

## 2023-07-03 PROCEDURE — 250N000009 HC RX 250: Performed by: INTERNAL MEDICINE

## 2023-07-03 PROCEDURE — 250N000011 HC RX IP 250 OP 636: Mod: JZ | Performed by: INTERNAL MEDICINE

## 2023-07-03 PROCEDURE — 74177 CT ABD & PELVIS W/CONTRAST: CPT

## 2023-07-03 PROCEDURE — 82565 ASSAY OF CREATININE: CPT

## 2023-07-03 RX ORDER — IOPAMIDOL 755 MG/ML
100 INJECTION, SOLUTION INTRAVASCULAR ONCE
Status: COMPLETED | OUTPATIENT
Start: 2023-07-03 | End: 2023-07-03

## 2023-07-03 RX ADMIN — IOPAMIDOL 100 ML: 755 INJECTION, SOLUTION INTRAVENOUS at 14:44

## 2023-07-03 RX ADMIN — SODIUM CHLORIDE 40 ML: 9 INJECTION, SOLUTION INTRAVENOUS at 14:45

## 2024-03-16 ENCOUNTER — HEALTH MAINTENANCE LETTER (OUTPATIENT)
Age: 59
End: 2024-03-16

## 2025-03-22 ENCOUNTER — HEALTH MAINTENANCE LETTER (OUTPATIENT)
Age: 60
End: 2025-03-22

## (undated) DEVICE — Device

## (undated) DEVICE — ESU LIGASURE LAPAROSCOPIC BLUNT TIP SEALER 5MMX37CM LF1837

## (undated) DEVICE — DRAPE SLEEVE 599

## (undated) DEVICE — SU PDS II 1 CT MONOFIL Z353H

## (undated) DEVICE — ESU ELEC BLADE 2.75" COATED/INSULATED E1455

## (undated) DEVICE — ENDO TROCAR BLUNT 100MM W/THRD ANCHOR BLUNTPORT BPT12STS

## (undated) DEVICE — SU VICRYL 3-0 SH 27" J316H

## (undated) DEVICE — SU PROLENE 2-0 SHDA 48" 8533H

## (undated) DEVICE — ENDO TRAP POLYP QUICK CATCH 710201

## (undated) DEVICE — SYR BULB IRRIG 50ML LATEX FREE 0035280

## (undated) DEVICE — STPL RELOAD CONTOUR CUT CVD THICK  CR40G

## (undated) DEVICE — LINEN GOWN OVERSIZE 5408

## (undated) DEVICE — SPONGE LAP 18X18" X8435

## (undated) DEVICE — GLOVE PROTEXIS W/NEU-THERA 7.0  2D73TE70

## (undated) DEVICE — ESU GROUND PAD UNIVERSAL W/O CORD

## (undated) DEVICE — DRAPE IOBAN INCISE 23X17" 6650EZ

## (undated) DEVICE — SOL NACL 0.9% INJ 1000ML BAG 2B1324X

## (undated) DEVICE — ESU PENCIL W/HOLSTER E2350H

## (undated) DEVICE — DRAIN JACKSON PRATT RESERVOIR 100ML SU130-1305

## (undated) DEVICE — BLADE KNIFE SURG 15 371115

## (undated) DEVICE — SU VICRYL 4-0 PS-2 18" UND J496H

## (undated) DEVICE — ENDO CANNULA 05MM VERSAONE UNIVERSAL UNVCA5STF

## (undated) DEVICE — SU VICRYL 0 CT-1 27" J340H

## (undated) DEVICE — GOWN IMPERVIOUS ZONED LG

## (undated) DEVICE — PACK LAP CHOLE SLC15LCFSD

## (undated) DEVICE — SOL WATER IRRIG 1000ML BOTTLE 2F7114

## (undated) DEVICE — SOL NACL 0.9% IRRIG 1000ML BOTTLE 07138-09

## (undated) DEVICE — DRSG DRAIN 4X4" 7086

## (undated) DEVICE — SU VICRYL 0 UR-6 27" J603H

## (undated) DEVICE — SU MONOCRYL 4-0 PS-2 18" UND Y496G

## (undated) DEVICE — BLADE KNIFE SURG 10 371110

## (undated) DEVICE — GLOVE PROTEXIS BLUE W/NEU-THERA 7.0  2D73EB70

## (undated) DEVICE — SU VICRYL 2-0 TIE 12X18" J905T

## (undated) DEVICE — SUCTION CANISTER MEDIVAC LINER 3000ML W/LID 65651-530

## (undated) DEVICE — SUCTION TIP YANKAUER W/O VENT K86

## (undated) DEVICE — ENDO TROCAR OPTICAL 05MM VERSAPORT PLUS W/FIX CAN ONB5STF

## (undated) DEVICE — PACK MINOR SBA15MIFSE

## (undated) DEVICE — ENDO SNARE POLYPECTOMY OVAL 15MM LOOP SD-240U-15

## (undated) DEVICE — DRAPE LAP TRANSVERSE 29421

## (undated) DEVICE — GLOVE PROTEXIS W/NEU-THERA 7.5  2D73TE75

## (undated) DEVICE — ESU CORD MONOPOLAR 10'  E0510

## (undated) DEVICE — PREP CHLORAPREP W/ORANGE TINT 10.5ML 260715

## (undated) DEVICE — SU VICRYL 3-0 SH CR 8X18" J774

## (undated) DEVICE — ESU HOLDER LAP INST DISP PURPLE LONG 330MM H-PRO-330

## (undated) DEVICE — DECANTER VIAL 2006S

## (undated) DEVICE — DRAPE LEGGINGS 8421

## (undated) DEVICE — KIT PATIENT POSITIONING PIGAZZI LATEX FREE 40580

## (undated) DEVICE — SUCTION IRR STRYKERFLOW II W/TIP 250-070-520

## (undated) DEVICE — LINEN TOWEL PACK X5 5464

## (undated) DEVICE — SU PDS II 3-0 SH 27" Z316H

## (undated) DEVICE — DRAIN JACKSON PRATT 19FR ROUND SU130-1325

## (undated) DEVICE — PREP CHLORAPREP 26ML TINTED ORANGE  260815

## (undated) DEVICE — STPL CIRCULAR DST 28MM W/TILT TIP EEA28

## (undated) DEVICE — SU ETHILON 2-0 FS 18" 664H

## (undated) DEVICE — STPL CONTOUR CUT CVD GREEN CS40G

## (undated) DEVICE — KIT ENDO TURNOVER/PROCEDURE W/CLEAN A SCOPE LINERS 103888

## (undated) RX ORDER — ACETAMINOPHEN 325 MG/1
TABLET ORAL
Status: DISPENSED
Start: 2018-01-04

## (undated) RX ORDER — GLYCOPYRROLATE 0.2 MG/ML
INJECTION, SOLUTION INTRAMUSCULAR; INTRAVENOUS
Status: DISPENSED
Start: 2018-01-04

## (undated) RX ORDER — PROPOFOL 10 MG/ML
INJECTION, EMULSION INTRAVENOUS
Status: DISPENSED
Start: 2018-09-14

## (undated) RX ORDER — HYDROMORPHONE HYDROCHLORIDE 1 MG/ML
INJECTION, SOLUTION INTRAMUSCULAR; INTRAVENOUS; SUBCUTANEOUS
Status: DISPENSED
Start: 2018-01-04

## (undated) RX ORDER — LIDOCAINE HYDROCHLORIDE 20 MG/ML
INJECTION, SOLUTION EPIDURAL; INFILTRATION; INTRACAUDAL; PERINEURAL
Status: DISPENSED
Start: 2018-01-04

## (undated) RX ORDER — LIDOCAINE HYDROCHLORIDE 20 MG/ML
INJECTION, SOLUTION EPIDURAL; INFILTRATION; INTRACAUDAL; PERINEURAL
Status: DISPENSED
Start: 2018-09-14

## (undated) RX ORDER — EPINEPHRINE 1 MG/ML
INJECTION, SOLUTION INTRAMUSCULAR; SUBCUTANEOUS
Status: DISPENSED
Start: 2018-01-04

## (undated) RX ORDER — DEXAMETHASONE SODIUM PHOSPHATE 4 MG/ML
INJECTION, SOLUTION INTRA-ARTICULAR; INTRALESIONAL; INTRAMUSCULAR; INTRAVENOUS; SOFT TISSUE
Status: DISPENSED
Start: 2018-01-04

## (undated) RX ORDER — ONDANSETRON 2 MG/ML
INJECTION INTRAMUSCULAR; INTRAVENOUS
Status: DISPENSED
Start: 2018-01-04

## (undated) RX ORDER — VECURONIUM BROMIDE 1 MG/ML
INJECTION, POWDER, LYOPHILIZED, FOR SOLUTION INTRAVENOUS
Status: DISPENSED
Start: 2018-01-04

## (undated) RX ORDER — LIDOCAINE HYDROCHLORIDE 10 MG/ML
INJECTION, SOLUTION INFILTRATION; PERINEURAL
Status: DISPENSED
Start: 2018-09-14

## (undated) RX ORDER — FENTANYL CITRATE 50 UG/ML
INJECTION, SOLUTION INTRAMUSCULAR; INTRAVENOUS
Status: DISPENSED
Start: 2018-01-04

## (undated) RX ORDER — ALBUMIN, HUMAN INJ 5% 5 %
SOLUTION INTRAVENOUS
Status: DISPENSED
Start: 2018-01-04

## (undated) RX ORDER — BUPIVACAINE HYDROCHLORIDE 5 MG/ML
INJECTION, SOLUTION EPIDURAL; INTRACAUDAL
Status: DISPENSED
Start: 2018-01-04

## (undated) RX ORDER — CIPROFLOXACIN 2 MG/ML
INJECTION, SOLUTION INTRAVENOUS
Status: DISPENSED
Start: 2018-01-04

## (undated) RX ORDER — ALVIMOPAN 12 MG/1
CAPSULE ORAL
Status: DISPENSED
Start: 2018-01-04

## (undated) RX ORDER — FENTANYL CITRATE 50 UG/ML
INJECTION, SOLUTION INTRAMUSCULAR; INTRAVENOUS
Status: DISPENSED
Start: 2018-09-14

## (undated) RX ORDER — HEPARIN SODIUM 5000 [USP'U]/.5ML
INJECTION, SOLUTION INTRAVENOUS; SUBCUTANEOUS
Status: DISPENSED
Start: 2018-01-04

## (undated) RX ORDER — ONDANSETRON 2 MG/ML
INJECTION INTRAMUSCULAR; INTRAVENOUS
Status: DISPENSED
Start: 2018-09-14

## (undated) RX ORDER — DIPHENHYDRAMINE HYDROCHLORIDE 50 MG/ML
INJECTION INTRAMUSCULAR; INTRAVENOUS
Status: DISPENSED
Start: 2018-01-04